# Patient Record
Sex: MALE | Race: WHITE | Employment: FULL TIME | URBAN - METROPOLITAN AREA
[De-identification: names, ages, dates, MRNs, and addresses within clinical notes are randomized per-mention and may not be internally consistent; named-entity substitution may affect disease eponyms.]

---

## 2018-01-23 ENCOUNTER — TELEPHONE (OUTPATIENT)
Dept: FAMILY MEDICINE CLINIC | Facility: CLINIC | Age: 58
End: 2018-01-23

## 2018-01-23 NOTE — TELEPHONE ENCOUNTER
This would be a new pt, wife is a pt of [address]  and is requesting we send a note back after she was informed your practice is close. Please call back and let pt know if [facility] can accept as new pt.

## 2018-01-24 NOTE — TELEPHONE ENCOUNTER
Called and informed pt on info. He would like to think about it before he picks a doctor. Informed him that there is a male doctor accepting in [place].

## 2018-01-29 ENCOUNTER — OFFICE VISIT (OUTPATIENT)
Dept: FAMILY MEDICINE CLINIC | Facility: CLINIC | Age: 58
End: 2018-01-29

## 2018-01-29 VITALS
HEART RATE: 70 BPM | BODY MASS INDEX: 27.6 KG/M2 | OXYGEN SATURATION: 95 % | SYSTOLIC BLOOD PRESSURE: 130 MMHG | DIASTOLIC BLOOD PRESSURE: 88 MMHG | HEIGHT: 68 IN | WEIGHT: 182.13 LBS | TEMPERATURE: 100 F

## 2018-01-29 DIAGNOSIS — M67.90 TENDINOPATHY: ICD-10-CM

## 2018-01-29 DIAGNOSIS — Z00.00 LABORATORY EXAM ORDERED AS PART OF ROUTINE GENERAL MEDICAL EXAMINATION: ICD-10-CM

## 2018-01-29 DIAGNOSIS — K40.90 LEFT INGUINAL HERNIA: ICD-10-CM

## 2018-01-29 DIAGNOSIS — Z12.5 SCREENING FOR PROSTATE CANCER: ICD-10-CM

## 2018-01-29 DIAGNOSIS — Z13.220 SCREENING, LIPID: ICD-10-CM

## 2018-01-29 DIAGNOSIS — L57.0 ACTINIC KERATOSES: ICD-10-CM

## 2018-01-29 DIAGNOSIS — Z12.11 COLON CANCER SCREENING: ICD-10-CM

## 2018-01-29 DIAGNOSIS — Z00.00 ROUTINE GENERAL MEDICAL EXAMINATION AT A HEALTH CARE FACILITY: Primary | ICD-10-CM

## 2018-01-29 DIAGNOSIS — Z13.1 SCREENING FOR DIABETES MELLITUS: ICD-10-CM

## 2018-01-29 DIAGNOSIS — Z13.0 SCREENING, ANEMIA, DEFICIENCY, IRON: ICD-10-CM

## 2018-01-29 DIAGNOSIS — K64.4 HEMORRHOIDAL SKIN TAG: ICD-10-CM

## 2018-01-29 DIAGNOSIS — Z23 NEED FOR DIPHTHERIA-TETANUS-PERTUSSIS (TDAP) VACCINE: ICD-10-CM

## 2018-01-29 PROCEDURE — 90471 IMMUNIZATION ADMIN: CPT | Performed by: FAMILY MEDICINE

## 2018-01-29 PROCEDURE — 90715 TDAP VACCINE 7 YRS/> IM: CPT | Performed by: FAMILY MEDICINE

## 2018-01-29 PROCEDURE — 99386 PREV VISIT NEW AGE 40-64: CPT | Performed by: FAMILY MEDICINE

## 2018-01-31 NOTE — PROGRESS NOTES
Jannette Bonilla is a 66year old male.     CC:  Patient presents with:  Physical: pt has knee and foot pain (small amout of pain at this time) room 5      HPI:  Here to estab and cpx    Feels generally well  Few skin lesions  Also  He has some chronic pain exertion  CARDIOVASCULAR: denies chest pain on exertion  GI: denies abdominal pain,denies heartburn   : denies nocturia or changes in stream  Hernia  See above  MUSCULOSKELETAL: denies back pain  Hand pain  Sl tender  NEURO: denies headaches  PSYCHE: den Orders    TETANUS, DIPHTHERIA TOXOIDS AND ACELLULAR PERTUSIS VACCINE (TDAP), >7 YEARS, IM USE (Completed)    Hemorrhoidal skin tag        observe  may discuss with dr Jerman Adame    Laboratory exam ordered as part of routine general medical examination        Re right dorsum #3 [facility]  Comments:  observe only          Meds & Refills for this Visit:  No prescriptions requested or ordered in this encounter    Imaging & Consults:  SURGERY - INTERNAL  TETANUS, DIPHTHERIA TOXOIDS AND ACELLULAR PERTUSIS VACCINE (TDAP), >7

## 2018-02-01 ENCOUNTER — LABORATORY ENCOUNTER (OUTPATIENT)
Dept: LAB | Age: 58
End: 2018-02-01
Attending: FAMILY MEDICINE
Payer: COMMERCIAL

## 2018-02-01 DIAGNOSIS — Z13.0 SCREENING, ANEMIA, DEFICIENCY, IRON: ICD-10-CM

## 2018-02-01 DIAGNOSIS — Z12.5 SCREENING FOR PROSTATE CANCER: ICD-10-CM

## 2018-02-01 DIAGNOSIS — Z13.1 SCREENING FOR DIABETES MELLITUS: ICD-10-CM

## 2018-02-01 DIAGNOSIS — Z13.220 SCREENING, LIPID: ICD-10-CM

## 2018-02-01 DIAGNOSIS — Z00.00 LABORATORY EXAM ORDERED AS PART OF ROUTINE GENERAL MEDICAL EXAMINATION: ICD-10-CM

## 2018-02-01 LAB
25-HYDROXYVITAMIN D (TOTAL): 13.9 NG/ML (ref 30–100)
ALBUMIN SERPL-MCNC: 3.8 G/DL (ref 3.5–4.8)
ALP LIVER SERPL-CCNC: 75 U/L (ref 45–117)
ALT SERPL-CCNC: 66 U/L (ref 17–63)
AST SERPL-CCNC: 42 U/L (ref 15–41)
BASOPHILS # BLD AUTO: 0.05 X10(3) UL (ref 0–0.1)
BASOPHILS NFR BLD AUTO: 0.9 %
BILIRUB SERPL-MCNC: 0.6 MG/DL (ref 0.1–2)
BUN BLD-MCNC: 10 MG/DL (ref 8–20)
CALCIUM BLD-MCNC: 8.7 MG/DL (ref 8.3–10.3)
CHLORIDE: 107 MMOL/L (ref 101–111)
CHOLEST SMN-MCNC: 189 MG/DL (ref ?–200)
CO2: 25 MMOL/L (ref 22–32)
COMPLEXED PSA SERPL-MCNC: 0.75 NG/ML (ref 0.01–4)
CREAT BLD-MCNC: 0.81 MG/DL (ref 0.7–1.3)
EOSINOPHIL # BLD AUTO: 0.42 X10(3) UL (ref 0–0.3)
EOSINOPHIL NFR BLD AUTO: 7.2 %
ERYTHROCYTE [DISTWIDTH] IN BLOOD BY AUTOMATED COUNT: 13.2 % (ref 11.5–16)
GLUCOSE BLD-MCNC: 95 MG/DL (ref 70–99)
HCT VFR BLD AUTO: 43.7 % (ref 37–53)
HDLC SERPL-MCNC: 58 MG/DL (ref 45–?)
HDLC SERPL: 3.26 {RATIO} (ref ?–4.97)
HGB BLD-MCNC: 14.4 G/DL (ref 13–17)
IMMATURE GRANULOCYTE COUNT: 0.03 X10(3) UL (ref 0–1)
IMMATURE GRANULOCYTE RATIO %: 0.5 %
LDLC SERPL CALC-MCNC: 91 MG/DL (ref ?–130)
LYMPHOCYTES # BLD AUTO: 1.59 X10(3) UL (ref 0.9–4)
LYMPHOCYTES NFR BLD AUTO: 27.1 %
M PROTEIN MFR SERPL ELPH: 7.1 G/DL (ref 6.1–8.3)
MCH RBC QN AUTO: 30.3 PG (ref 27–33.2)
MCHC RBC AUTO-ENTMCNC: 33 G/DL (ref 31–37)
MCV RBC AUTO: 91.8 FL (ref 80–99)
MONOCYTES # BLD AUTO: 0.58 X10(3) UL (ref 0.1–0.6)
MONOCYTES NFR BLD AUTO: 9.9 %
NEUTROPHIL ABS PRELIM: 3.2 X10 (3) UL (ref 1.3–6.7)
NEUTROPHILS # BLD AUTO: 3.2 X10(3) UL (ref 1.3–6.7)
NEUTROPHILS NFR BLD AUTO: 54.4 %
NONHDLC SERPL-MCNC: 131 MG/DL (ref ?–130)
PLATELET # BLD AUTO: 297 10(3)UL (ref 150–450)
POTASSIUM SERPL-SCNC: 4 MMOL/L (ref 3.6–5.1)
RBC # BLD AUTO: 4.76 X10(6)UL (ref 4.3–5.7)
RED CELL DISTRIBUTION WIDTH-SD: 44.7 FL (ref 35.1–46.3)
SODIUM SERPL-SCNC: 141 MMOL/L (ref 136–144)
TRIGL SERPL-MCNC: 200 MG/DL (ref ?–150)
VLDLC SERPL CALC-MCNC: 40 MG/DL (ref 5–40)
WBC # BLD AUTO: 5.9 X10(3) UL (ref 4–13)

## 2018-02-01 PROCEDURE — 36415 COLL VENOUS BLD VENIPUNCTURE: CPT | Performed by: FAMILY MEDICINE

## 2018-02-01 PROCEDURE — 84153 ASSAY OF PSA TOTAL: CPT | Performed by: FAMILY MEDICINE

## 2018-02-01 PROCEDURE — 85025 COMPLETE CBC W/AUTO DIFF WBC: CPT | Performed by: FAMILY MEDICINE

## 2018-02-01 PROCEDURE — 82306 VITAMIN D 25 HYDROXY: CPT | Performed by: FAMILY MEDICINE

## 2018-02-01 PROCEDURE — 80053 COMPREHEN METABOLIC PANEL: CPT | Performed by: FAMILY MEDICINE

## 2018-02-01 PROCEDURE — 80061 LIPID PANEL: CPT | Performed by: FAMILY MEDICINE

## 2018-02-20 ENCOUNTER — TELEPHONE (OUTPATIENT)
Dept: SURGERY | Facility: CLINIC | Age: 58
End: 2018-02-20

## 2018-02-20 ENCOUNTER — OFFICE VISIT (OUTPATIENT)
Dept: SURGERY | Facility: CLINIC | Age: 58
End: 2018-02-20

## 2018-02-20 VITALS
WEIGHT: 186 LBS | DIASTOLIC BLOOD PRESSURE: 90 MMHG | SYSTOLIC BLOOD PRESSURE: 128 MMHG | BODY MASS INDEX: 28 KG/M2 | TEMPERATURE: 97 F

## 2018-02-20 DIAGNOSIS — R19.4 CHANGE IN BOWEL HABITS: Primary | ICD-10-CM

## 2018-02-20 DIAGNOSIS — Z12.11 SPECIAL SCREENING FOR MALIGNANT NEOPLASMS, COLON: Primary | ICD-10-CM

## 2018-02-20 PROCEDURE — 99243 OFF/OP CNSLTJ NEW/EST LOW 30: CPT | Performed by: SURGERY

## 2018-02-20 NOTE — H&P
Ramesh Matthews is a 66year old male  Patient presents with:  Consult: Dr Sarina Du. Consult for colonoscopy. No change in BM's or blood in stool. Maternal grandma passed from colon ca. She was dx in her 66's.        REFERRED BY    Patient presents  For colon source Temporal, weight 186 lb. GENERAL: well developed, well nourished male, in no apparent distress.     MENTAL STATUS :Alert, oriented x 3  PSYCH: normal mood and affect  SKIN: anicteric, no rashes, no bruising  EYES: PERRLA, EOMI, sclera anicteric,  co 25-Hydroxyvitamin D (Total) 02/01/2018 13.9* 30.0 - 100.0 ng/mL Final   • Prostate Specific Antigen Screen 02/01/2018 0.75  0.01 - 4.00 ng/mL Final   • WBC 02/01/2018 5.9  4.0 - 13.0 x10(3) uL Final   • RBC 02/01/2018 4.76  4.30 - 5.70 x10(6)uL Final   • H

## 2018-02-21 RX ORDER — MULTIVIT-MIN/IRON/FOLIC ACID/K 18-600-40
1 CAPSULE ORAL 2 TIMES DAILY
COMMUNITY
End: 2021-04-23

## 2018-02-21 RX ORDER — MAGNESIUM CARB/ALUMINUM HYDROX 105-160MG
1 TABLET,CHEWABLE ORAL DAILY
COMMUNITY
End: 2021-02-15

## 2018-02-28 ENCOUNTER — TELEPHONE (OUTPATIENT)
Dept: FAMILY MEDICINE CLINIC | Facility: CLINIC | Age: 58
End: 2018-02-28

## 2018-02-28 NOTE — TELEPHONE ENCOUNTER
CPT: 78593  DX: Z12.11    I called [employer] and used the automated system. No auth required for out pt surgery.  Conf # is [id number]. sapna

## 2018-03-04 ENCOUNTER — ANESTHESIA EVENT (OUTPATIENT)
Dept: ENDOSCOPY | Facility: HOSPITAL | Age: 58
End: 2018-03-04

## 2018-03-05 ENCOUNTER — HOSPITAL ENCOUNTER (OUTPATIENT)
Facility: HOSPITAL | Age: 58
Setting detail: HOSPITAL OUTPATIENT SURGERY
Discharge: HOME | End: 2018-03-05
Attending: SURGERY | Admitting: SURGERY
Payer: COMMERCIAL

## 2018-03-05 ENCOUNTER — ANESTHESIA (OUTPATIENT)
Dept: ENDOSCOPY | Facility: HOSPITAL | Age: 58
End: 2018-03-05

## 2018-03-05 ENCOUNTER — SURGERY (OUTPATIENT)
Age: 58
End: 2018-03-05

## 2018-03-05 VITALS
WEIGHT: 180 LBS | HEART RATE: 65 BPM | TEMPERATURE: 99 F | SYSTOLIC BLOOD PRESSURE: 111 MMHG | BODY MASS INDEX: 27.28 KG/M2 | HEIGHT: 68 IN | RESPIRATION RATE: 18 BRPM | OXYGEN SATURATION: 94 % | DIASTOLIC BLOOD PRESSURE: 79 MMHG

## 2018-03-05 DIAGNOSIS — Z12.11 SPECIAL SCREENING FOR MALIGNANT NEOPLASMS, COLON: ICD-10-CM

## 2018-03-05 PROCEDURE — 0DBN8ZX EXCISION OF SIGMOID COLON, VIA NATURAL OR ARTIFICIAL OPENING ENDOSCOPIC, DIAGNOSTIC: ICD-10-PCS | Performed by: SURGERY

## 2018-03-05 PROCEDURE — 3E0H8GC INTRODUCTION OF OTHER THERAPEUTIC SUBSTANCE INTO LOWER GI, VIA NATURAL OR ARTIFICIAL OPENING ENDOSCOPIC: ICD-10-PCS | Performed by: SURGERY

## 2018-03-05 PROCEDURE — 0DBL8ZX EXCISION OF TRANSVERSE COLON, VIA NATURAL OR ARTIFICIAL OPENING ENDOSCOPIC, DIAGNOSTIC: ICD-10-PCS | Performed by: SURGERY

## 2018-03-05 PROCEDURE — 88305 TISSUE EXAM BY PATHOLOGIST: CPT | Performed by: SURGERY

## 2018-03-05 RX ORDER — MORPHINE SULFATE 4 MG/ML
2 INJECTION, SOLUTION INTRAMUSCULAR; INTRAVENOUS EVERY 5 MIN PRN
Status: DISCONTINUED | OUTPATIENT
Start: 2018-03-05 | End: 2018-03-05

## 2018-03-05 RX ORDER — SODIUM CHLORIDE, SODIUM LACTATE, POTASSIUM CHLORIDE, CALCIUM CHLORIDE 600; 310; 30; 20 MG/100ML; MG/100ML; MG/100ML; MG/100ML
INJECTION, SOLUTION INTRAVENOUS CONTINUOUS
Status: DISCONTINUED | OUTPATIENT
Start: 2018-03-05 | End: 2018-03-05

## 2018-03-05 RX ORDER — NALOXONE HYDROCHLORIDE 0.4 MG/ML
80 INJECTION, SOLUTION INTRAMUSCULAR; INTRAVENOUS; SUBCUTANEOUS AS NEEDED
Status: DISCONTINUED | OUTPATIENT
Start: 2018-03-05 | End: 2018-03-05

## 2018-03-05 NOTE — H&P
Patient presents  For colonoscopy screening   Pt has never had prior colonoscopy   No constipation or diahrrea  No family ho of colon cancer or polyps   Grandmother with colon cancer   No blood no change in BMs  Pos change in bowel habits            .    bruising  EYES: PERRLA, EOMI, sclera anicteric,  conjunctiva without pallor  HEENT: normocephalic, atraumatic, TMs clear, nares patent, mouth moist, pharynx w/o erythema  NECK: supple, no JVD, no adenopathy, no thyromegaly  RESPIRATORY: clear to auscultati RBC 02/01/2018 4.76  4.30 - 5.70 x10(6)uL Final   • HGB 02/01/2018 14.4  13.0 - 17.0 g/dL Final   • HCT 02/01/2018 43.7  37.0 - 53.0 % Final   • PLT 02/01/2018 297.0  150.0 - 450.0 10(3)uL Final   • MCV 02/01/2018 91.8  80.0 - 99.0 fL Final   • [facility] 02/01/2

## 2018-03-05 NOTE — ANESTHESIA PREPROCEDURE EVALUATION
PRE-OP EVALUATION    Patient Name: Matthew Plata    Pre-op Diagnosis: Special screening for malignant neoplasms, colon [Z12.11]    Procedure(s):  COLONOSCOPY    Surgeon(s) and Role:     Bertrand Olvera DO - [place]    Pre-op vitals reviewed.         Body  02/01/2018   K 4.0 02/01/2018    02/01/2018   CO2 25.0 02/01/2018   BUN 10 02/01/2018   CREATSERUM 0.81 02/01/2018   GLU 95 02/01/2018   CA 8.7 02/01/2018            Airway      Mallampati: II  Mouth opening: >3 FB  TM distance: > 6 cm  Neck

## 2018-03-05 NOTE — ANESTHESIA POSTPROCEDURE EVALUATION
[address] [place] Patient Status:  Hospital Outpatient Surgery   Age/Gender 66year old male MRN [id number]   Location [address] [facility]. Attending Pita Lerma, [facility] Day # 0 PCP Dejah Salas MD       Anesthesia Post-op Note

## 2018-03-06 ENCOUNTER — MED REC SCAN ONLY (OUTPATIENT)
Dept: FAMILY MEDICINE CLINIC | Facility: CLINIC | Age: 58
End: 2018-03-06

## 2018-03-06 NOTE — OPERATIVE REPORT
[facility]    PATIENT'S NAME: Bessy Sandy DOT   ATTENDING PHYSICIAN: Margaretmary Goldberg, D.O.   OPERATING PHYSICIAN: Margaretmary Goldberg, D.O.   PATIENT ACCOUNT#:   [id number]    LOCATION:  [address] [address] #:   [id number] out at the base adjacent to the polypectomy site. Patient also had scattered rare diverticulosis. The scope was then slowly withdrawn through the remainder of the rectum and the scope was retrieved. Patient tolerated the procedure well.     Dictated By Greg Hastings

## 2018-03-09 ENCOUNTER — TELEPHONE (OUTPATIENT)
Dept: FAMILY MEDICINE CLINIC | Facility: CLINIC | Age: 58
End: 2018-03-09

## 2018-03-09 NOTE — TELEPHONE ENCOUNTER
Pt states after he received the TDAP at his physical on 1/29/18 he had a headache for 3 weeks. The headache is now gone but wants to know if the headache could of been from the TDAP? Pt aware I will send the note to Dr Ruth Ann Rose and I will call him back.  sapna

## 2018-03-09 NOTE — TELEPHONE ENCOUNTER
This is not a side effect that I am hearing about prior    I cannot say that it is not from that, but not common

## 2019-04-29 ENCOUNTER — TELEPHONE (OUTPATIENT)
Dept: FAMILY MEDICINE CLINIC | Facility: CLINIC | Age: 59
End: 2019-04-29

## 2019-04-29 NOTE — TELEPHONE ENCOUNTER
Spouse Suzanne called and wants to know if Dr Dai David will take her  Galilea Zhang as a new patient?

## 2019-08-02 ENCOUNTER — PATIENT OUTREACH (OUTPATIENT)
Dept: SURGERY | Facility: CLINIC | Age: 59
End: 2019-08-02

## 2021-01-21 ENCOUNTER — TELEPHONE (OUTPATIENT)
Dept: FAMILY MEDICINE CLINIC | Facility: CLINIC | Age: 61
End: 2021-01-21

## 2021-01-21 NOTE — TELEPHONE ENCOUNTER
Wife is asking if she can bring in a urine sample ,she thinks he has a UTI. he has a persistent cough. Wife had covid in Dec he had same symptoms in dec but has had cough since.   Please advise

## 2021-01-21 NOTE — TELEPHONE ENCOUNTER
Pt returned call. Please call back,thanks     Wife was advised - to seek care at [facility] or . She will contact insurance to see if one is less expensive.

## 2021-02-15 ENCOUNTER — OFFICE VISIT (OUTPATIENT)
Dept: FAMILY MEDICINE CLINIC | Facility: CLINIC | Age: 61
End: 2021-02-15

## 2021-02-15 VITALS
BODY MASS INDEX: 27 KG/M2 | DIASTOLIC BLOOD PRESSURE: 82 MMHG | RESPIRATION RATE: 16 BRPM | OXYGEN SATURATION: 99 % | WEIGHT: 175 LBS | HEART RATE: 72 BPM | SYSTOLIC BLOOD PRESSURE: 130 MMHG | TEMPERATURE: 97 F

## 2021-02-15 DIAGNOSIS — R30.0 DYSURIA: Primary | ICD-10-CM

## 2021-02-15 LAB
MULTISTIX LOT#: ABNORMAL NUMERIC
PH, URINE: 7 (ref 4.5–8)
SPECIFIC GRAVITY: 1.01 (ref 1–1.03)
URINE-COLOR: YELLOW

## 2021-02-15 PROCEDURE — 3079F DIAST BP 80-89 MM HG: CPT | Performed by: PHYSICIAN ASSISTANT

## 2021-02-15 PROCEDURE — 87086 URINE CULTURE/COLONY COUNT: CPT | Performed by: PHYSICIAN ASSISTANT

## 2021-02-15 PROCEDURE — 3075F SYST BP GE 130 - 139MM HG: CPT | Performed by: PHYSICIAN ASSISTANT

## 2021-02-15 PROCEDURE — 87186 SC STD MICRODIL/AGAR DIL: CPT | Performed by: PHYSICIAN ASSISTANT

## 2021-02-15 PROCEDURE — 87077 CULTURE AEROBIC IDENTIFY: CPT | Performed by: PHYSICIAN ASSISTANT

## 2021-02-15 PROCEDURE — 99213 OFFICE O/P EST LOW 20 MIN: CPT | Performed by: PHYSICIAN ASSISTANT

## 2021-02-15 PROCEDURE — 81003 URINALYSIS AUTO W/O SCOPE: CPT | Performed by: PHYSICIAN ASSISTANT

## 2021-02-15 RX ORDER — CIPROFLOXACIN 500 MG/1
500 TABLET, FILM COATED ORAL 2 TIMES DAILY
Qty: 28 TABLET | Refills: 0 | Status: SHIPPED | OUTPATIENT
Start: 2021-02-15 | End: 2021-03-23

## 2021-02-15 NOTE — PATIENT INSTRUCTIONS
Bladder Infection, Male (Adult)     You have a bladder infection. Urine is normally free of bacteria. But bacteria can get into the urinary tract from the skin around the rectum. Or it may travel in the blood from other parts of the body.   This is sandra The most common cause of bladder infections is bacteria from the bowels. The bacteria get onto the skin around the opening of the urethra. From there they can get into the urine and travel up to the bladder. This causes inflammation and an infection.  This · Drink plenty of fluids, unless your healthcare provider told you not to. Fluids will prevent dehydration and flush out your bladder. · Use good personal hygiene. Wipe from front to back after using the toilet, and clean your penis regularly.  If you aren © 8506-8032 [employer] [address]. All rights reserved. This information is not intended as a substitute for professional medical care. Always follow your healthcare professional's instructions.

## 2021-02-15 NOTE — PROGRESS NOTES
CHIEF COMPLAINT:   Patient presents with:  UTI      HPI:   Rima Valenzuela is a 63year old male who presents with symptoms of UTI. Complaining of dysuria and frequency for the last 3 weeks. His Symptoms have been somewhat waxing and waning.   Treatment /82   Pulse 72   Temp 97 °F (36.1 °C)   Resp 16   Wt 175 lb (79.4 kg)   SpO2 99%   BMI 26.61 kg/m²   GENERAL: well developed, well nourished, and in no apparent distress  CARDIO: RRR, no murmurs  LUNGS: clear to ausculation bilaterally, no wheezing o This is called a urinary tract infection (UTI). An infection can occur anywhere in the urinary tract. It could be in a kidney (pyelonephritis) or in the bladder (cystitis) and urethra (urethritis).  The urethra is the tube that drains urine from the bladder The most common cause of bladder infections is bacteria from the bowels. The bacteria get onto the skin around the opening of the urethra. From there they can get into the urine and travel up to the bladder. This causes inflammation and an infection.  This · Drink plenty of fluids, unless your healthcare provider told you not to. Fluids will prevent dehydration and flush out your bladder. · Use good personal hygiene. Wipe from front to back after using the toilet, and clean your penis regularly.  If you aren © 6059-7917 [employer] [address]. All rights reserved. This information is not intended as a substitute for professional medical care. Always follow your healthcare professional's instructions.           The patient indicates understanding of these iss

## 2021-03-23 ENCOUNTER — OFFICE VISIT (OUTPATIENT)
Dept: FAMILY MEDICINE CLINIC | Facility: CLINIC | Age: 61
End: 2021-03-23

## 2021-03-23 VITALS
WEIGHT: 187.81 LBS | OXYGEN SATURATION: 98 % | TEMPERATURE: 98 F | DIASTOLIC BLOOD PRESSURE: 80 MMHG | BODY MASS INDEX: 28.46 KG/M2 | HEART RATE: 53 BPM | SYSTOLIC BLOOD PRESSURE: 120 MMHG | HEIGHT: 68 IN

## 2021-03-23 DIAGNOSIS — M79.672 CHRONIC PAIN OF BOTH FEET: ICD-10-CM

## 2021-03-23 DIAGNOSIS — K63.5 POLYP OF COLON, UNSPECIFIED PART OF COLON, UNSPECIFIED TYPE: ICD-10-CM

## 2021-03-23 DIAGNOSIS — K40.90 UNILATERAL INGUINAL HERNIA WITHOUT OBSTRUCTION OR GANGRENE, RECURRENCE NOT SPECIFIED: ICD-10-CM

## 2021-03-23 DIAGNOSIS — L57.0 ACTINIC KERATOSES: ICD-10-CM

## 2021-03-23 DIAGNOSIS — M79.671 CHRONIC PAIN OF BOTH FEET: ICD-10-CM

## 2021-03-23 DIAGNOSIS — K76.89 LIVER CYST: ICD-10-CM

## 2021-03-23 DIAGNOSIS — K42.9 UMBILICAL HERNIA WITHOUT OBSTRUCTION AND WITHOUT GANGRENE: ICD-10-CM

## 2021-03-23 DIAGNOSIS — G89.29 CHRONIC PAIN OF BOTH FEET: ICD-10-CM

## 2021-03-23 DIAGNOSIS — H91.93 BILATERAL HEARING LOSS, UNSPECIFIED HEARING LOSS TYPE: ICD-10-CM

## 2021-03-23 DIAGNOSIS — Z00.00 ROUTINE HISTORY AND PHYSICAL EXAMINATION OF ADULT: Primary | ICD-10-CM

## 2021-03-23 DIAGNOSIS — Z13.29 THYROID DISORDER SCREEN: ICD-10-CM

## 2021-03-23 DIAGNOSIS — Z13.0 SCREENING, ANEMIA, DEFICIENCY, IRON: ICD-10-CM

## 2021-03-23 DIAGNOSIS — Z13.220 LIPID SCREENING: ICD-10-CM

## 2021-03-23 DIAGNOSIS — R05.3 CHRONIC COUGH: ICD-10-CM

## 2021-03-23 DIAGNOSIS — Z12.11 COLON CANCER SCREENING: ICD-10-CM

## 2021-03-23 DIAGNOSIS — Z13.1 DIABETES MELLITUS SCREENING: ICD-10-CM

## 2021-03-23 DIAGNOSIS — Z77.011 LEAD EXPOSURE: ICD-10-CM

## 2021-03-23 DIAGNOSIS — N28.1 RENAL CYST: ICD-10-CM

## 2021-03-23 PROCEDURE — 80053 COMPREHEN METABOLIC PANEL: CPT | Performed by: FAMILY MEDICINE

## 2021-03-23 PROCEDURE — 82785 ASSAY OF IGE: CPT | Performed by: FAMILY MEDICINE

## 2021-03-23 PROCEDURE — 86003 ALLG SPEC IGE CRUDE XTRC EA: CPT | Performed by: FAMILY MEDICINE

## 2021-03-23 PROCEDURE — 84443 ASSAY THYROID STIM HORMONE: CPT | Performed by: FAMILY MEDICINE

## 2021-03-23 PROCEDURE — 3008F BODY MASS INDEX DOCD: CPT | Performed by: FAMILY MEDICINE

## 2021-03-23 PROCEDURE — 83036 HEMOGLOBIN GLYCOSYLATED A1C: CPT | Performed by: FAMILY MEDICINE

## 2021-03-23 PROCEDURE — 99386 PREV VISIT NEW AGE 40-64: CPT | Performed by: FAMILY MEDICINE

## 2021-03-23 PROCEDURE — 80061 LIPID PANEL: CPT | Performed by: FAMILY MEDICINE

## 2021-03-23 PROCEDURE — 3074F SYST BP LT 130 MM HG: CPT | Performed by: FAMILY MEDICINE

## 2021-03-23 PROCEDURE — 3079F DIAST BP 80-89 MM HG: CPT | Performed by: FAMILY MEDICINE

## 2021-03-23 PROCEDURE — 85025 COMPLETE CBC W/AUTO DIFF WBC: CPT | Performed by: FAMILY MEDICINE

## 2021-03-23 PROCEDURE — 83655 ASSAY OF LEAD: CPT | Performed by: FAMILY MEDICINE

## 2021-03-23 NOTE — PROGRESS NOTES
Amanda Garvey is a 63year old male who presents for a complete physical exam.   HPI:   Pt complains of a few things:    1) Would like referral for colonoscopy, had polyps 3 years ago, due for repeat.     2) would like a referral to podiatry for his feet Results   Component Value Date    HDL 57 03/23/2021    HDL 58 02/01/2018     Lab Results   Component Value Date     (H) 03/23/2021    LDL 91 02/01/2018     Lab Results   Component Value Date    AST 21 03/23/2021    AST 42 (H) 02/01/2018     Lab Resu supple,no adenopathy,no JVD  LUNGS: clear to auscultation  CARDIO: RRR without murmur  GI: good BS's,no masses, HSM or tenderness  : two descended testes,no masses,no hernia,no penile lesions  RECTAL: good rectal tone, prostate shows no masses, stool is gangrene  -     SURGERY - INTERNAL    Actinic keratoses  -     DERM - INTERNAL    Chronic pain of both feet  -     PODIATRY - INTERNAL    Liver cyst  -     US ABDOMEN COMPLETE (CPT=76700); Future    Renal cyst  -     US ABDOMEN COMPLETE (CPT=76700);  Future LIPID PANEL;  Future  -     ASSAY, THYROID STIM HORMONE; Future  -     PSA SCREEN; Future  -     HEMOGLOBIN A1C; Future  -     LEAD STANDARD PROFILE, BLOOD; Future  -     ALLERGY REGION 8; Future    Bilateral hearing loss, unspecified hearing loss type  -

## 2021-04-13 ENCOUNTER — OFFICE VISIT (OUTPATIENT)
Dept: SURGERY | Facility: CLINIC | Age: 61
End: 2021-04-13

## 2021-04-13 VITALS — HEART RATE: 70 BPM | BODY MASS INDEX: 28.34 KG/M2 | WEIGHT: 187 LBS | HEIGHT: 68 IN

## 2021-04-13 DIAGNOSIS — K63.5 POLYP OF COLON, UNSPECIFIED PART OF COLON, UNSPECIFIED TYPE: ICD-10-CM

## 2021-04-13 DIAGNOSIS — K40.90 INGUINAL HERNIA WITHOUT OBSTRUCTION OR GANGRENE, RECURRENCE NOT SPECIFIED, UNSPECIFIED LATERALITY: Primary | ICD-10-CM

## 2021-04-13 PROCEDURE — 3008F BODY MASS INDEX DOCD: CPT | Performed by: SURGERY

## 2021-04-13 PROCEDURE — 99244 OFF/OP CNSLTJ NEW/EST MOD 40: CPT | Performed by: SURGERY

## 2021-04-13 RX ORDER — SODIUM, POTASSIUM,MAG SULFATES 17.5-3.13G
SOLUTION, RECONSTITUTED, ORAL ORAL
Qty: 1 KIT | Refills: 0 | Status: SHIPPED | OUTPATIENT
Start: 2021-04-13 | End: 2021-07-12

## 2021-04-13 NOTE — H&P
Ciera Peterson is a 63year old male  Patient presents with:  Colonoscopy: est patient referred by Dr. Jose Ashley for colonoscopy consult. Last colonoscopy done 3 years-hx polyps  Hernia: est pt- referred by Dr. Jose Ashley for multiple hernias.   Denies any pain or d constipation, diarrhea; no rectal bleeding; no heartburn  GENITAL/: no dysuria, urgency or frequency, no tea colored urine  MUSCULOSKELETAL: no joint complaints upper or lower extremities  HEMATOLOGY: denies hx anemia; denies bruising or excessive bleedi Alkaline Phosphatase 03/23/2021 69  45 - 117 U/L Final   • Bilirubin, Total 03/23/2021 0.6  0.1 - 2.0 mg/dL Final   • Total Protein 03/23/2021 7.4  6.4 - 8.2 g/dL Final   • Albumin 03/23/2021 3.9  3.4 - 5.0 g/dL Final   • Globulin  03/23/2021 3.5  2.8 - 4. biotin consumption at least 72 hours prior to collection of a new sample.      • Prostate Specific Antigen Screen 03/23/2021 2.85  <=4.00 ng/mL Final    Comment: INTERPRETIVE INFORMATION: TOTAL PROSTATE SPECIFIC ANTIGEN:    The Siemens Total PSA(TPSA)chemil recommended by the American Diabetes Association. eAG levels reflect the long term average glucose and may not correlate with random or fasting glucose levels since these represent specific points in time.           • Lead, Blood (Venous) 03/23/2021 3.6  <= biological                                 monitoring are recommended. All ages        21-68.6 ug/dL  Removal from lead exposure                                 and prompt medical                                 evaluation are recommended. Common Pigweed 03/23/2021 <0.10  <0.10 kUA/L Final   • Allergen Common Ragweed 03/23/2021 0.71* <0.10 kUA/L Final   • Allergen Frio Tree 03/23/2021 <0.10  <0.10 kUA/L Final   • Allergen D. Farinae 03/23/2021 <0.10  <0.10 kUA/L Final   • Allergen D.  P Absolute 03/23/2021 1.44  1.00 - 4.00 x10(3) uL Final   • Monocyte Absolute 03/23/2021 0.43  0.10 - 1.00 x10(3) uL Final   • Eosinophil Absolute 03/23/2021 0.30  0.00 - 0.70 x10(3) uL Final   • Basophil Absolute 03/23/2021 0.06  0.00 - 0.20 x10(3) uL Final

## 2021-04-23 ENCOUNTER — OFFICE VISIT (OUTPATIENT)
Dept: ORTHOPEDICS CLINIC | Facility: CLINIC | Age: 61
End: 2021-04-23

## 2021-04-23 DIAGNOSIS — R26.9 GAIT ABNORMALITY: Primary | ICD-10-CM

## 2021-04-23 DIAGNOSIS — M19.079 ARTHRITIS OF MIDFOOT: ICD-10-CM

## 2021-04-23 PROCEDURE — 99202 OFFICE O/P NEW SF 15 MIN: CPT | Performed by: PODIATRIST

## 2021-04-23 NOTE — PROGRESS NOTES
[facility] New Patient Note    CC: Patient presents with:   Foot Pain: Patient is here today for bilateral foot pain, patient states that it hurts the most after being on his feet all day and taking a rest. He states that when he takes that firs type  Pain over dorsal lateral midfoot, vague, no swelling        Assessment/Diagnoses:  Midfoot arthritis david  Gait abnormality  High arches with arch pain david feet    Plan:  I reviewed exam findings with the patient.     checkk into custom orthotics

## 2021-05-05 ENCOUNTER — HOSPITAL ENCOUNTER (OUTPATIENT)
Dept: ULTRASOUND IMAGING | Facility: HOSPITAL | Age: 61
Discharge: HOME | End: 2021-05-05
Attending: FAMILY MEDICINE
Payer: COMMERCIAL

## 2021-05-05 ENCOUNTER — HOSPITAL ENCOUNTER (OUTPATIENT)
Dept: CT IMAGING | Facility: HOSPITAL | Age: 61
Discharge: HOME | End: 2021-05-05
Attending: FAMILY MEDICINE
Payer: COMMERCIAL

## 2021-05-05 DIAGNOSIS — Z77.011 LEAD EXPOSURE: ICD-10-CM

## 2021-05-05 DIAGNOSIS — R05.3 CHRONIC COUGH: ICD-10-CM

## 2021-05-05 DIAGNOSIS — K76.89 LIVER CYST: ICD-10-CM

## 2021-05-05 DIAGNOSIS — N28.1 RENAL CYST: ICD-10-CM

## 2021-05-05 PROCEDURE — 76700 US EXAM ABDOM COMPLETE: CPT | Performed by: FAMILY MEDICINE

## 2021-05-05 PROCEDURE — 71250 CT THORAX DX C-: CPT | Performed by: FAMILY MEDICINE

## 2021-05-10 DIAGNOSIS — K76.89 HEPATIC CYST: ICD-10-CM

## 2021-05-10 DIAGNOSIS — N28.1 RENAL CYST: Primary | ICD-10-CM

## 2021-05-19 ENCOUNTER — PATIENT MESSAGE (OUTPATIENT)
Dept: FAMILY MEDICINE CLINIC | Facility: CLINIC | Age: 61
End: 2021-05-19

## 2021-05-19 DIAGNOSIS — H90.3 SENSORY HEARING LOSS, BILATERAL: Primary | ICD-10-CM

## 2021-05-19 NOTE — TELEPHONE ENCOUNTER
From: Pauline Almanzar  To: Ananya Kan MD  Sent: 5/19/2021 10:49 AM CDT  Subject: Referral Request    Hi Dr. Cindy Núñez, I met with Fabian Ellison, an associate of Dr. Baron Erwin, about acquiring hearing aids.  I am asking you for a referral to proceed

## 2021-06-02 PROCEDURE — 88305 TISSUE EXAM BY PATHOLOGIST: CPT | Performed by: SURGERY

## 2021-06-11 ENCOUNTER — TELEPHONE (OUTPATIENT)
Dept: ORTHOPEDICS CLINIC | Facility: CLINIC | Age: 61
End: 2021-06-11

## 2021-06-11 NOTE — TELEPHONE ENCOUNTER
[facility] called to state that they need the codes changed for the DME referral. They have faxed over DME referral request , Fax printed and given to medical staff on 6/11/21 .

## 2021-06-23 ENCOUNTER — PATIENT OUTREACH (OUTPATIENT)
Dept: SURGERY | Facility: CLINIC | Age: 61
End: 2021-06-23

## 2021-06-24 ENCOUNTER — TELEPHONE (OUTPATIENT)
Dept: SURGERY | Facility: CLINIC | Age: 61
End: 2021-06-24

## 2021-06-24 NOTE — TELEPHONE ENCOUNTER
----- Message from Jazzmine Pederson DO sent at 6/4/2021  4:32 PM CDT -----  Call patient tell him pathology is benign repeat colonoscopy in 5 years and put in recall  Spoke with pt all questions answered and 5 yr recall entered.

## 2021-08-18 PROBLEM — N28.1 COMPLEX RENAL CYST: Status: ACTIVE | Noted: 2021-08-18

## 2021-10-08 ENCOUNTER — TELEPHONE (OUTPATIENT)
Dept: FAMILY MEDICINE CLINIC | Facility: CLINIC | Age: 61
End: 2021-10-08

## 2021-10-08 DIAGNOSIS — R73.03 PREDIABETES: ICD-10-CM

## 2021-10-08 DIAGNOSIS — E78.5 HYPERLIPIDEMIA, UNSPECIFIED HYPERLIPIDEMIA TYPE: Primary | ICD-10-CM

## 2021-10-08 NOTE — TELEPHONE ENCOUNTER
Letter mailed to patient reminding him he is due for labs.     Lab Frequency Next Occurrence   HEMOGLOBIN A1C Once 10/08/2021   LIPID PANEL Once 10/08/2021

## 2021-11-09 ENCOUNTER — TELEPHONE (OUTPATIENT)
Dept: FAMILY MEDICINE CLINIC | Facility: CLINIC | Age: 61
End: 2021-11-09

## 2022-01-28 ENCOUNTER — OFFICE VISIT (OUTPATIENT)
Dept: SURGERY | Facility: CLINIC | Age: 62
End: 2022-01-28
Payer: COMMERCIAL

## 2022-01-28 VITALS — BODY MASS INDEX: 28.04 KG/M2 | HEIGHT: 68 IN | WEIGHT: 185 LBS | TEMPERATURE: 97 F

## 2022-01-28 DIAGNOSIS — K42.9 UMBILICAL HERNIA WITHOUT OBSTRUCTION AND WITHOUT GANGRENE: Primary | ICD-10-CM

## 2022-01-28 DIAGNOSIS — K40.90 INGUINAL HERNIA, LEFT: ICD-10-CM

## 2022-01-28 PROCEDURE — 3008F BODY MASS INDEX DOCD: CPT | Performed by: SURGERY

## 2022-01-28 PROCEDURE — 99244 OFF/OP CNSLTJ NEW/EST MOD 40: CPT | Performed by: SURGERY

## 2022-01-28 NOTE — H&P
Luis F Camacho is a 63year old male  Patient presents with:  Hernia: est pt--left inguinal and umbilical hernia. pt denies pain but does notice a lump.        REFERRED BY    Patient presents with  Left inguinal hernia three years ago and umbilical hernia chest pain or YIP; no palpitations   GI: denies nausea, vomiting, constipation, diarrhea; no rectal bleeding; no heartburn  GENITAL/: no dysuria, urgency or frequency, no tea colored urine  MUSCULOSKELETAL: no joint complaints upper or lower extremities 08/18/2021 7/2,022  Date Final       ASSESSMENT   Imp: Umbilical and left inguinal hernia  Plan: Laparoscopic left inguinal herniorrhaphy with mesh and umbilical herniorrhaphy possible mesh discussed with patient risks of surgery to include bleeding infect

## 2022-02-19 ENCOUNTER — LAB ENCOUNTER (OUTPATIENT)
Dept: LAB | Age: 62
End: 2022-02-19
Attending: SURGERY
Payer: COMMERCIAL

## 2022-02-19 DIAGNOSIS — K40.90 INGUINAL HERNIA: ICD-10-CM

## 2022-02-20 LAB — SARS-COV-2 RNA RESP QL NAA+PROBE: NOT DETECTED

## 2022-02-21 ENCOUNTER — ANESTHESIA (OUTPATIENT)
Dept: SURGERY | Facility: HOSPITAL | Age: 62
End: 2022-02-21
Payer: COMMERCIAL

## 2022-02-21 ENCOUNTER — HOSPITAL ENCOUNTER (OUTPATIENT)
Facility: HOSPITAL | Age: 62
Setting detail: HOSPITAL OUTPATIENT SURGERY
Discharge: HOME | End: 2022-02-21
Attending: SURGERY | Admitting: SURGERY
Payer: COMMERCIAL

## 2022-02-21 ENCOUNTER — ANESTHESIA EVENT (OUTPATIENT)
Dept: SURGERY | Facility: HOSPITAL | Age: 62
End: 2022-02-21
Payer: COMMERCIAL

## 2022-02-21 VITALS
WEIGHT: 185 LBS | OXYGEN SATURATION: 98 % | SYSTOLIC BLOOD PRESSURE: 123 MMHG | DIASTOLIC BLOOD PRESSURE: 75 MMHG | HEART RATE: 67 BPM | RESPIRATION RATE: 16 BRPM | HEIGHT: 68 IN | BODY MASS INDEX: 28.04 KG/M2 | TEMPERATURE: 98 F

## 2022-02-21 DIAGNOSIS — K42.9 UMBILICAL HERNIA WITHOUT OBSTRUCTION AND WITHOUT GANGRENE: ICD-10-CM

## 2022-02-21 DIAGNOSIS — K40.90 INGUINAL HERNIA: Primary | ICD-10-CM

## 2022-02-21 DIAGNOSIS — K40.90 INGUINAL HERNIA, LEFT: ICD-10-CM

## 2022-02-21 PROCEDURE — 0WQF0ZZ REPAIR ABDOMINAL WALL, OPEN APPROACH: ICD-10-PCS | Performed by: SURGERY

## 2022-02-21 PROCEDURE — 0YU64JZ SUPPLEMENT LEFT INGUINAL REGION WITH SYNTHETIC SUBSTITUTE, PERCUTANEOUS ENDOSCOPIC APPROACH: ICD-10-PCS | Performed by: SURGERY

## 2022-02-21 DEVICE — BARD MESH
Type: IMPLANTABLE DEVICE | Site: UMBILICAL | Status: FUNCTIONAL
Brand: BARD MESH

## 2022-02-21 RX ORDER — GLYCOPYRROLATE 0.2 MG/ML
INJECTION, SOLUTION INTRAMUSCULAR; INTRAVENOUS AS NEEDED
Status: DISCONTINUED | OUTPATIENT
Start: 2022-02-21 | End: 2022-02-21 | Stop reason: SURG

## 2022-02-21 RX ORDER — SODIUM CHLORIDE, SODIUM LACTATE, POTASSIUM CHLORIDE, CALCIUM CHLORIDE 600; 310; 30; 20 MG/100ML; MG/100ML; MG/100ML; MG/100ML
INJECTION, SOLUTION INTRAVENOUS CONTINUOUS
Status: DISCONTINUED | OUTPATIENT
Start: 2022-02-21 | End: 2022-02-21

## 2022-02-21 RX ORDER — LIDOCAINE HYDROCHLORIDE 10 MG/ML
INJECTION, SOLUTION EPIDURAL; INFILTRATION; INTRACAUDAL; PERINEURAL AS NEEDED
Status: DISCONTINUED | OUTPATIENT
Start: 2022-02-21 | End: 2022-02-21 | Stop reason: SURG

## 2022-02-21 RX ORDER — ROCURONIUM BROMIDE 10 MG/ML
INJECTION, SOLUTION INTRAVENOUS AS NEEDED
Status: DISCONTINUED | OUTPATIENT
Start: 2022-02-21 | End: 2022-02-21 | Stop reason: SURG

## 2022-02-21 RX ORDER — ONDANSETRON 2 MG/ML
INJECTION INTRAMUSCULAR; INTRAVENOUS AS NEEDED
Status: DISCONTINUED | OUTPATIENT
Start: 2022-02-21 | End: 2022-02-21 | Stop reason: SURG

## 2022-02-21 RX ORDER — HEPARIN SODIUM 5000 [USP'U]/ML
5000 INJECTION, SOLUTION INTRAVENOUS; SUBCUTANEOUS ONCE
Status: COMPLETED | OUTPATIENT
Start: 2022-02-21 | End: 2022-02-21

## 2022-02-21 RX ORDER — HYDROCODONE BITARTRATE AND ACETAMINOPHEN 5; 325 MG/1; MG/1
2 TABLET ORAL AS NEEDED
Status: DISCONTINUED | OUTPATIENT
Start: 2022-02-21 | End: 2022-02-21

## 2022-02-21 RX ORDER — NALOXONE HYDROCHLORIDE 0.4 MG/ML
80 INJECTION, SOLUTION INTRAMUSCULAR; INTRAVENOUS; SUBCUTANEOUS AS NEEDED
Status: DISCONTINUED | OUTPATIENT
Start: 2022-02-21 | End: 2022-02-21

## 2022-02-21 RX ORDER — HYDROMORPHONE HYDROCHLORIDE 1 MG/ML
INJECTION, SOLUTION INTRAMUSCULAR; INTRAVENOUS; SUBCUTANEOUS
Status: COMPLETED
Start: 2022-02-21 | End: 2022-02-21

## 2022-02-21 RX ORDER — KETOROLAC TROMETHAMINE 30 MG/ML
INJECTION, SOLUTION INTRAMUSCULAR; INTRAVENOUS AS NEEDED
Status: DISCONTINUED | OUTPATIENT
Start: 2022-02-21 | End: 2022-02-21 | Stop reason: SURG

## 2022-02-21 RX ORDER — DEXAMETHASONE SODIUM PHOSPHATE 4 MG/ML
VIAL (ML) INJECTION AS NEEDED
Status: DISCONTINUED | OUTPATIENT
Start: 2022-02-21 | End: 2022-02-21 | Stop reason: SURG

## 2022-02-21 RX ORDER — CEFAZOLIN SODIUM/WATER 2 G/20 ML
2 SYRINGE (ML) INTRAVENOUS ONCE
Status: COMPLETED | OUTPATIENT
Start: 2022-02-21 | End: 2022-02-21

## 2022-02-21 RX ORDER — ACETAMINOPHEN 500 MG
1000 TABLET ORAL ONCE
Status: DISCONTINUED | OUTPATIENT
Start: 2022-02-21 | End: 2022-02-21 | Stop reason: HOSPADM

## 2022-02-21 RX ORDER — HYDROMORPHONE HYDROCHLORIDE 1 MG/ML
0.4 INJECTION, SOLUTION INTRAMUSCULAR; INTRAVENOUS; SUBCUTANEOUS EVERY 5 MIN PRN
Status: DISCONTINUED | OUTPATIENT
Start: 2022-02-21 | End: 2022-02-21

## 2022-02-21 RX ORDER — ONDANSETRON 2 MG/ML
4 INJECTION INTRAMUSCULAR; INTRAVENOUS AS NEEDED
Status: DISCONTINUED | OUTPATIENT
Start: 2022-02-21 | End: 2022-02-21

## 2022-02-21 RX ORDER — HYDROCODONE BITARTRATE AND ACETAMINOPHEN 5; 325 MG/1; MG/1
1 TABLET ORAL AS NEEDED
Status: DISCONTINUED | OUTPATIENT
Start: 2022-02-21 | End: 2022-02-21

## 2022-02-21 RX ORDER — NEOSTIGMINE METHYLSULFATE 1 MG/ML
INJECTION INTRAVENOUS AS NEEDED
Status: DISCONTINUED | OUTPATIENT
Start: 2022-02-21 | End: 2022-02-21 | Stop reason: SURG

## 2022-02-21 RX ORDER — BUPIVACAINE HYDROCHLORIDE AND EPINEPHRINE 5; 5 MG/ML; UG/ML
INJECTION, SOLUTION EPIDURAL; INTRACAUDAL; PERINEURAL AS NEEDED
Status: DISCONTINUED | OUTPATIENT
Start: 2022-02-21 | End: 2022-02-21 | Stop reason: HOSPADM

## 2022-02-21 RX ORDER — HYDROCODONE BITARTRATE AND ACETAMINOPHEN 5; 325 MG/1; MG/1
1 TABLET ORAL EVERY 6 HOURS PRN
Qty: 15 TABLET | Refills: 0 | Status: SHIPPED | OUTPATIENT
Start: 2022-02-21 | End: 2022-03-25

## 2022-02-21 RX ADMIN — SODIUM CHLORIDE, SODIUM LACTATE, POTASSIUM CHLORIDE, CALCIUM CHLORIDE: 600; 310; 30; 20 INJECTION, SOLUTION INTRAVENOUS at 08:34:00

## 2022-02-21 RX ADMIN — ONDANSETRON 4 MG: 2 INJECTION INTRAMUSCULAR; INTRAVENOUS at 08:35:00

## 2022-02-21 RX ADMIN — NEOSTIGMINE METHYLSULFATE 4 MG: 1 INJECTION INTRAVENOUS at 09:05:00

## 2022-02-21 RX ADMIN — CEFAZOLIN SODIUM/WATER 2 G: 2 G/20 ML SYRINGE (ML) INTRAVENOUS at 07:39:00

## 2022-02-21 RX ADMIN — SODIUM CHLORIDE, SODIUM LACTATE, POTASSIUM CHLORIDE, CALCIUM CHLORIDE: 600; 310; 30; 20 INJECTION, SOLUTION INTRAVENOUS at 07:29:00

## 2022-02-21 RX ADMIN — GLYCOPYRROLATE 0.6 MG: 0.2 INJECTION, SOLUTION INTRAMUSCULAR; INTRAVENOUS at 09:05:00

## 2022-02-21 RX ADMIN — KETOROLAC TROMETHAMINE 30 MG: 30 INJECTION, SOLUTION INTRAMUSCULAR; INTRAVENOUS at 08:35:00

## 2022-02-21 RX ADMIN — SODIUM CHLORIDE, SODIUM LACTATE, POTASSIUM CHLORIDE, CALCIUM CHLORIDE: 600; 310; 30; 20 INJECTION, SOLUTION INTRAVENOUS at 08:02:00

## 2022-02-21 RX ADMIN — ROCURONIUM BROMIDE 40 MG: 10 INJECTION, SOLUTION INTRAVENOUS at 07:35:00

## 2022-02-21 RX ADMIN — GLYCOPYRROLATE 0.2 MG: 0.2 INJECTION, SOLUTION INTRAMUSCULAR; INTRAVENOUS at 08:16:00

## 2022-02-21 RX ADMIN — DEXAMETHASONE SODIUM PHOSPHATE 8 MG: 4 MG/ML VIAL (ML) INJECTION at 07:39:00

## 2022-02-21 RX ADMIN — LIDOCAINE HYDROCHLORIDE 50 MG: 10 INJECTION, SOLUTION EPIDURAL; INFILTRATION; INTRACAUDAL; PERINEURAL at 07:35:00

## 2022-02-21 RX ADMIN — ROCURONIUM BROMIDE 10 MG: 10 INJECTION, SOLUTION INTRAVENOUS at 08:00:00

## 2022-02-21 NOTE — ANESTHESIA POSTPROCEDURE EVALUATION
[address] Patient Status:  Hospital Outpatient Surgery   Age/Gender 63year old male MRN [id number]   Location [address] [facility] Attending Queen Nancy, [address] [facility] Day # 0 PCP Sasha Gorman MD       Anesthesia Post-op Note    LAPAROSCOPIC LEFT INGUINAL HERNIA REPAIR WITH MESH AND UMBILICAL HERNIORRHAPHY. Procedure Summary     Date: 02/21/22 Room / Location: [facility] MAIN OR 08 / [facility] MAIN OR    Anesthesia Start: 0531 Anesthesia Stop: 9188    Procedures:       LAPAROSCOPIC LEFT INGUINAL HERNIA REPAIR WITH MESH AND UMBILICAL HERNIORRHAPHY. (Left Abdomen)      HERNIA UMBILICAL REPAIR ADULT (N/A Abdomen) Diagnosis:       Umbilical hernia without obstruction and without gangrene      Inguinal hernia, left      (Umbilical hernia without obstruction and without gangrene [X35. 9]Inguinal hernia, left [K40.90])    Surgeons: Queen Nancy DO Anesthesiologist: April Louis MD    Anesthesia Type: general ASA Status: 2          Anesthesia Type: general    Vitals Value Taken Time   /71 02/21/22 0924   Temp 97.4 02/21/22 0924   Pulse 73 02/21/22 0924   Resp 12 02/21/22 0924   SpO2 93 02/21/22 0924       Patient Location: PACU    Anesthesia Type: general    Airway Patency: patent    Postop Pain Control: adequate    Mental Status: mildly sedated but able to meaningfully participate in the post-anesthesia evaluation    Nausea/Vomiting: none    Cardiopulmonary/Hydration status: stable euvolemic    Complications: no apparent anesthesia related complications    Postop vital signs: stable    Dental Exam: Unchanged from Preop    Patient to be discharged from PACU when criteria met.

## 2022-02-21 NOTE — ANESTHESIA PROCEDURE NOTES
Airway  Date/Time: 2/21/2022 7:37 AM  Urgency: elective      General Information and Staff    Patient location during procedure: OR  Anesthesiologist: Denisha Naik MD  Performed: anesthesiologist     Indications and Patient Condition  Indications for airway management: anesthesia  Spontaneous Ventilation: absent  Sedation level: deep  Preoxygenated: yes  Patient position: sniffing  Mask difficulty assessment: 1 - vent by mask    Final Airway Details  Final airway type: endotracheal airway      Successful airway: ETT  Cuffed: yes   Successful intubation technique: direct laryngoscopy  Endotracheal tube insertion site: oral  Blade: Von  Blade size: #3  ETT size (mm): 7.5    Cormack-Lehane Classification: grade I - full view of glottis  Placement verified by: chest auscultation and capnometry   Cuff volume (mL): 6  Measured from: lips  Number of attempts at approach: 1

## 2022-02-23 NOTE — OPERATIVE REPORT
[facility]    PATIENT'S NAME: Baron Delgadillo   ATTENDING PHYSICIAN: Sanjuanita Childers D.O.   OPERATING PHYSICIAN: Sanjuanita Childers D.O.   PATIENT ACCOUNT#:   [de-identified]    LOCATION:  [facility] PRE [address] [place] [address]  MEDICAL RECORD #:   [id number]       YOB: 1960  ADMISSION DATE:       02/21/2022      OPERATION DATE:  02/21/2022    OPERATIVE REPORT      PREOPERATIVE DIAGNOSIS:  Left inguinal hernia and umbilical hernia. POSTOPERATIVE DIAGNOSIS:  Direct and indirect left inguinal hernia with umbilical hernia. PROCEDURE:  Laparoscopic left inguinal herniorrhaphy with mesh and umbilical herniorrhaphy. ASSISTANT:  Safia Churchill PA-C. ANESTHESIA:  General.    COMPLICATIONS:  None. OPERATIVE TECHNIQUE:  An informed consent was previously obtained. The patient was taken to the operative suite and placed in the supine position. General inhalational anesthetic was administered by the anesthesia department, and the anterior abdomen was prepped and draped in usual sterile fashion. A skin incision was made 1 cm superior to the umbilicus and carried along the left-hand side of the umbilicus. Sharp dissection carried down through skin and subcutaneous tissue. Dissection was carried out down into the depths of the umbilical stalk which was amputated using handheld electrocautery. The peritoneum was grasped and brought up into the incision, incised, and the abdomen was entered. A Fanta trocar was placed at this location. Insufflation was carried out of the abdominal cavity. The #5 trocars were placed at the right and left mid quadrant under laparoscopic visualization. Attention was directed toward the left lower quadrant where the patient was found to have obvious evidence of a direct hernial defect. The peritoneum 4 cm cephalad to the internal ring was grasped, tented posteriorly and incised in a transverse fashion.   The preperitoneal space was developed and dissected down to the pubic tubercle. The direct hernial defect was reduced back into the preperitoneal space. Further dissection was carried out laterally at the internal ring demonstrating a large lipoma of the cord which was reduced also back into the preperitoneal space with blunt dissection. A 3 x 5 Prolene mesh was then placed within the abdominal cavity. It was tacked along the anterior pubic ramus, lateral to the internal ring and posterior to the rectus musculature. The posterior peritoneum was then brought in opposition to the anterior peritoneum and tacked at 1 cm intervals. Trocars were then removed, and attention was directed toward the umbilicus where the fascial layer was approximated using interrupted 2-0 Ethibond in a pants-over-vest fashion. The posterior umbilical skin was sutured to the fascia using interrupted 3-0 Vicryl suture. Skin edges approximated using a 4-0 Monocryl in a running subdermal fashion. Overlying Mastisol and Steri-Strips were applied. Patient was transported from the operative suite to Recovery in stable condition.     Dictated By Toma Long D.O.  d: 02/22/2022 17:49:57  t: 02/22/2022 21:41:39  Job [id number]  /    cc: Kaylah Hernandez D.O.

## 2022-03-02 ENCOUNTER — OFFICE VISIT (OUTPATIENT)
Dept: ORTHOPEDICS CLINIC | Facility: CLINIC | Age: 62
End: 2022-03-02
Payer: COMMERCIAL

## 2022-03-02 ENCOUNTER — HOSPITAL ENCOUNTER (OUTPATIENT)
Dept: GENERAL RADIOLOGY | Age: 62
Discharge: HOME | End: 2022-03-02
Attending: PODIATRIST
Payer: COMMERCIAL

## 2022-03-02 VITALS — HEIGHT: 68 IN | WEIGHT: 185 LBS | BODY MASS INDEX: 28.04 KG/M2

## 2022-03-02 DIAGNOSIS — M79.672 LEFT FOOT PAIN: ICD-10-CM

## 2022-03-02 DIAGNOSIS — M19.079 ARTHRITIS, MIDFOOT: ICD-10-CM

## 2022-03-02 DIAGNOSIS — M79.672 LEFT FOOT PAIN: Primary | ICD-10-CM

## 2022-03-02 PROCEDURE — 73630 X-RAY EXAM OF FOOT: CPT | Performed by: PODIATRIST

## 2022-03-02 PROCEDURE — 99213 OFFICE O/P EST LOW 20 MIN: CPT | Performed by: PODIATRIST

## 2022-03-02 PROCEDURE — 3008F BODY MASS INDEX DOCD: CPT | Performed by: PODIATRIST

## 2022-03-02 RX ORDER — MELOXICAM 7.5 MG/1
7.5 TABLET ORAL DAILY
Qty: 30 TABLET | Refills: 1 | Status: SHIPPED | OUTPATIENT
Start: 2022-03-02

## 2022-03-02 NOTE — PROGRESS NOTES
[facility] Progress Note    Subjective:   Here for follow-up of his left foot. He has put himself in his low-profile boot to help with some of the pain. About 1 month ago left foot started hurting  About 1 year ago, had vague pain over top of both feet  Has had orthotics from [facility] for months now and really had helped  No injury to left  Wearing same work boots and got a bigger size with the new orthotics several months ago      Objective:     Exam pain across the dorsal midfoot and dorsal lateral aspect of the left foot. No visible swelling today. xrays show mild midfoot OAD left      Assessment/Plan:     Diagnoses and all orders for this visit:    Left foot pain  -     XR FOOT WEIGHTBEARING (3 VIEWS), LEFT (CPT=73630); Future    Arthritis, midfoot        10 you in the Robo boot if that is helpful. We discussed the midfoot arthritis and what he can expect, times of exacerbation and then times where he has less discomfort. May need orthotics new in 1 year   meloxicam Rx  Follow up 6 weeks or so if not improving otherwise yearly with Dayana Richards DPM  [facility]    You.Do speech recognition software was used to prepare this note. If a word or phrase is confusing, it is likely do to a failure of recognition. Please contact me with any questions or clarifications.

## 2022-03-25 ENCOUNTER — OFFICE VISIT (OUTPATIENT)
Dept: SURGERY | Facility: CLINIC | Age: 62
End: 2022-03-25

## 2022-03-25 VITALS — TEMPERATURE: 98 F | WEIGHT: 195 LBS | HEIGHT: 68 IN | BODY MASS INDEX: 29.55 KG/M2

## 2022-03-25 DIAGNOSIS — K40.90 LEFT INGUINAL HERNIA: Primary | ICD-10-CM

## 2022-03-25 PROCEDURE — 3008F BODY MASS INDEX DOCD: CPT | Performed by: SURGERY

## 2022-03-25 PROCEDURE — 99024 POSTOP FOLLOW-UP VISIT: CPT | Performed by: SURGERY

## 2022-03-25 NOTE — PROGRESS NOTES
Patient status post laparoscopic left inguinal herniorrhaphy with mesh and umbilical herniorrhaphy he denies complaints tolerating his current activities. Physical examination demonstrates no evidence of residual hernia.   Left testicle is normal on examination plan no exertion for 1 week follow-up as needed

## 2022-04-01 ENCOUNTER — OFFICE VISIT (OUTPATIENT)
Dept: FAMILY MEDICINE CLINIC | Facility: CLINIC | Age: 62
End: 2022-04-01
Payer: COMMERCIAL

## 2022-04-01 VITALS
WEIGHT: 199 LBS | HEART RATE: 72 BPM | SYSTOLIC BLOOD PRESSURE: 120 MMHG | HEIGHT: 67.75 IN | OXYGEN SATURATION: 97 % | RESPIRATION RATE: 16 BRPM | DIASTOLIC BLOOD PRESSURE: 84 MMHG | TEMPERATURE: 98 F | BODY MASS INDEX: 30.51 KG/M2

## 2022-04-01 DIAGNOSIS — Z00.00 ANNUAL PHYSICAL EXAM: Primary | ICD-10-CM

## 2022-04-01 DIAGNOSIS — M62.08 DIASTASIS RECTI: ICD-10-CM

## 2022-04-01 DIAGNOSIS — Z13.0 SCREENING, ANEMIA, DEFICIENCY, IRON: ICD-10-CM

## 2022-04-01 DIAGNOSIS — R73.03 PREDIABETES: ICD-10-CM

## 2022-04-01 DIAGNOSIS — E78.5 HYPERLIPIDEMIA, UNSPECIFIED HYPERLIPIDEMIA TYPE: ICD-10-CM

## 2022-04-01 DIAGNOSIS — Z98.890 S/P LEFT INGUINAL HERNIA REPAIR: ICD-10-CM

## 2022-04-01 DIAGNOSIS — Z87.19 S/P LEFT INGUINAL HERNIA REPAIR: ICD-10-CM

## 2022-04-01 DIAGNOSIS — Z13.29 SCREENING FOR THYROID DISORDER: ICD-10-CM

## 2022-04-01 DIAGNOSIS — K76.89 LIVER CYST: ICD-10-CM

## 2022-04-01 DIAGNOSIS — K63.5 POLYP OF COLON, UNSPECIFIED PART OF COLON, UNSPECIFIED TYPE: ICD-10-CM

## 2022-04-01 DIAGNOSIS — N28.1 COMPLEX RENAL CYST: ICD-10-CM

## 2022-04-01 PROBLEM — K40.90 LEFT INGUINAL HERNIA: Status: RESOLVED | Noted: 2022-02-21 | Resolved: 2022-04-01

## 2022-04-01 PROBLEM — K42.9 UMBILICAL HERNIA WITHOUT OBSTRUCTION AND WITHOUT GANGRENE: Status: RESOLVED | Noted: 2022-02-21 | Resolved: 2022-04-01

## 2022-04-01 LAB
ALBUMIN SERPL-MCNC: 4 G/DL (ref 3.4–5)
ALBUMIN/GLOB SERPL: 1.2 {RATIO} (ref 1–2)
ALP LIVER SERPL-CCNC: 66 U/L
ALT SERPL-CCNC: 39 U/L
ANION GAP SERPL CALC-SCNC: 8 MMOL/L (ref 0–18)
AST SERPL-CCNC: 23 U/L (ref 15–37)
BASOPHILS # BLD AUTO: 0.06 X10(3) UL (ref 0–0.2)
BASOPHILS NFR BLD AUTO: 0.9 %
BILIRUB SERPL-MCNC: 0.5 MG/DL (ref 0.1–2)
BUN BLD-MCNC: 19 MG/DL (ref 7–18)
CHLORIDE SERPL-SCNC: 108 MMOL/L (ref 98–112)
CHOLEST SERPL-MCNC: 232 MG/DL (ref ?–200)
CO2 SERPL-SCNC: 24 MMOL/L (ref 21–32)
CREAT BLD-MCNC: 0.82 MG/DL
EOSINOPHIL # BLD AUTO: 0.26 X10(3) UL (ref 0–0.7)
EOSINOPHIL NFR BLD AUTO: 4 %
ERYTHROCYTE [DISTWIDTH] IN BLOOD BY AUTOMATED COUNT: 12.5 %
EST. AVERAGE GLUCOSE BLD GHB EST-MCNC: 134 MG/DL (ref 68–126)
FASTING PATIENT LIPID ANSWER: YES
FASTING STATUS PATIENT QL REPORTED: YES
GLOBULIN PLAS-MCNC: 3.3 G/DL (ref 2.8–4.4)
GLUCOSE BLD-MCNC: 120 MG/DL (ref 70–99)
HBA1C MFR BLD: 6.3 % (ref ?–5.7)
HCT VFR BLD AUTO: 49.9 %
HGB BLD-MCNC: 16.6 G/DL
IMM GRANULOCYTES # BLD AUTO: 0.02 X10(3) UL (ref 0–1)
IMM GRANULOCYTES NFR BLD: 0.3 %
LDLC SERPL CALC-MCNC: 148 MG/DL (ref ?–100)
LYMPHOCYTES # BLD AUTO: 1.73 X10(3) UL (ref 1–4)
LYMPHOCYTES NFR BLD AUTO: 26.7 %
MCH RBC QN AUTO: 30.1 PG (ref 26–34)
MCHC RBC AUTO-ENTMCNC: 33.3 G/DL (ref 31–37)
MCV RBC AUTO: 90.6 FL
MONOCYTES # BLD AUTO: 0.6 X10(3) UL (ref 0.1–1)
MONOCYTES NFR BLD AUTO: 9.3 %
NEUTROPHILS # BLD AUTO: 3.8 X10 (3) UL (ref 1.5–7.7)
NEUTROPHILS # BLD AUTO: 3.8 X10(3) UL (ref 1.5–7.7)
NEUTROPHILS NFR BLD AUTO: 58.8 %
NONHDLC SERPL-MCNC: 173 MG/DL (ref ?–130)
OSMOLALITY SERPL CALC.SUM OF ELEC: 293 MOSM/KG (ref 275–295)
PLATELET # BLD AUTO: 220 10(3)UL (ref 150–450)
POTASSIUM SERPL-SCNC: 4.2 MMOL/L (ref 3.5–5.1)
PROT SERPL-MCNC: 7.3 G/DL (ref 6.4–8.2)
RBC # BLD AUTO: 5.51 X10(6)UL
SODIUM SERPL-SCNC: 140 MMOL/L (ref 136–145)
T4 FREE SERPL-MCNC: 0.8 NG/DL (ref 0.8–1.7)
TRIGL SERPL-MCNC: 143 MG/DL (ref 30–149)
TSI SER-ACNC: 3.76 MIU/ML (ref 0.36–3.74)
VLDLC SERPL CALC-MCNC: 27 MG/DL (ref 0–30)
WBC # BLD AUTO: 6.5 X10(3) UL (ref 4–11)

## 2022-04-01 PROCEDURE — 84439 ASSAY OF FREE THYROXINE: CPT | Performed by: FAMILY MEDICINE

## 2022-04-01 PROCEDURE — 99396 PREV VISIT EST AGE 40-64: CPT | Performed by: FAMILY MEDICINE

## 2022-04-01 PROCEDURE — 3008F BODY MASS INDEX DOCD: CPT | Performed by: FAMILY MEDICINE

## 2022-04-01 PROCEDURE — 3074F SYST BP LT 130 MM HG: CPT | Performed by: FAMILY MEDICINE

## 2022-04-01 PROCEDURE — 90471 IMMUNIZATION ADMIN: CPT | Performed by: FAMILY MEDICINE

## 2022-04-01 PROCEDURE — 83036 HEMOGLOBIN GLYCOSYLATED A1C: CPT | Performed by: FAMILY MEDICINE

## 2022-04-01 PROCEDURE — 85025 COMPLETE CBC W/AUTO DIFF WBC: CPT | Performed by: FAMILY MEDICINE

## 2022-04-01 PROCEDURE — 80053 COMPREHEN METABOLIC PANEL: CPT | Performed by: FAMILY MEDICINE

## 2022-04-01 PROCEDURE — 80061 LIPID PANEL: CPT | Performed by: FAMILY MEDICINE

## 2022-04-01 PROCEDURE — 84443 ASSAY THYROID STIM HORMONE: CPT | Performed by: FAMILY MEDICINE

## 2022-04-01 PROCEDURE — 90750 HZV VACC RECOMBINANT IM: CPT | Performed by: FAMILY MEDICINE

## 2022-04-01 PROCEDURE — 3079F DIAST BP 80-89 MM HG: CPT | Performed by: FAMILY MEDICINE

## 2022-04-11 ENCOUNTER — TELEPHONE (OUTPATIENT)
Dept: FAMILY MEDICINE CLINIC | Facility: CLINIC | Age: 62
End: 2022-04-11

## 2022-04-11 NOTE — TELEPHONE ENCOUNTER
Patient advised of Doctor's note below. Patient verbalized understanding. No further questions at this time.     Pt to call back to schedule appt

## 2022-04-11 NOTE — TELEPHONE ENCOUNTER
----- Message from Mydhili Ulysses Loupe, MD sent at 4/10/2022  3:58 PM CDT -----  Sugars trending up, and A1C still 6.3 (0.1 away from being a diabetic). Cholesterol also definitely needs to be addressed, these numbers are concerning for increase risk of heart disease. We should discuss interventions / medications at this time. Please have him schedule a video visit with me.  TY ~ MM

## 2022-05-16 ENCOUNTER — PATIENT MESSAGE (OUTPATIENT)
Dept: ORTHOPEDICS CLINIC | Facility: CLINIC | Age: 62
End: 2022-05-16

## 2022-05-16 RX ORDER — MELOXICAM 7.5 MG/1
7.5 TABLET ORAL DAILY
Qty: 30 TABLET | Refills: 1 | Status: SHIPPED | OUTPATIENT
Start: 2022-05-16

## 2022-05-16 NOTE — TELEPHONE ENCOUNTER
From: Lucy Abreu  To: Adrien Alpers.  Shira Moreno DPM  Sent: 5/16/2022 9:40 AM CDT  Subject: Prescription Refill    Hi Dr. Shira Moreno  Please renew my anti-inflammatory prescription: Meloxicam 7.5 mg.   I use [facility] [place]   Thank you!!  Kennedy Amador 01/30/1960  [phone]

## 2022-06-02 ENCOUNTER — TELEPHONE (OUTPATIENT)
Dept: FAMILY MEDICINE CLINIC | Facility: CLINIC | Age: 62
End: 2022-06-02

## 2022-06-02 NOTE — TELEPHONE ENCOUNTER
PT is due for his 2nd Shingrix vaccine. Letter mailed to him letting him know to call and schedule.      MATT Rizzo Mydhili Nurse  Due for 2nd Shingrix

## 2022-06-11 ENCOUNTER — TELEPHONE (OUTPATIENT)
Dept: FAMILY MEDICINE CLINIC | Facility: CLINIC | Age: 62
End: 2022-06-11

## 2022-06-15 ENCOUNTER — NURSE ONLY (OUTPATIENT)
Dept: FAMILY MEDICINE CLINIC | Facility: CLINIC | Age: 62
End: 2022-06-15
Payer: COMMERCIAL

## 2022-06-15 PROCEDURE — 90750 HZV VACC RECOMBINANT IM: CPT | Performed by: FAMILY MEDICINE

## 2022-06-15 PROCEDURE — 90471 IMMUNIZATION ADMIN: CPT | Performed by: FAMILY MEDICINE

## 2022-06-15 NOTE — PROGRESS NOTES
Madhu Person present in office for nurse visit. Shingrix Vaccine(s) as ordered by Dr. Dev Martinez and Expiration date verified with [facility] RN, Administered to right deltoid, VIS sheet(s) given to pt     All questions/concerns addressed. Patient left in stable condition.

## 2022-07-13 RX ORDER — MELOXICAM 7.5 MG/1
7.5 TABLET ORAL DAILY
Qty: 30 TABLET | Refills: 0 | Status: SHIPPED | OUTPATIENT
Start: 2022-07-13

## 2022-08-26 ENCOUNTER — PATIENT MESSAGE (OUTPATIENT)
Dept: ORTHOPEDICS CLINIC | Facility: CLINIC | Age: 62
End: 2022-08-26

## 2022-08-29 RX ORDER — MELOXICAM 7.5 MG/1
7.5 TABLET ORAL DAILY
Qty: 30 TABLET | Refills: 0 | Status: SHIPPED | OUTPATIENT
Start: 2022-08-29

## 2022-08-29 NOTE — TELEPHONE ENCOUNTER
From: Ghazala Jaquez  To: Esperanza Beltrán. Taniya Joshi DPM  Sent: 8/26/2022 11:15 PM CDT  Subject: Renew Prescription,     Hi Dr. Taniya Joshi  This is Ethyl Huge, 01/30/1960. Please re-prescribe for me Meloxicam 7.5 mg. [address] [place], [place]. [phone].   Thank you!!  Reanna Sutton

## 2022-10-10 ENCOUNTER — PATIENT MESSAGE (OUTPATIENT)
Dept: ORTHOPEDICS CLINIC | Facility: CLINIC | Age: 62
End: 2022-10-10

## 2022-10-10 NOTE — TELEPHONE ENCOUNTER
From: Reyna Meckel  To: Randa Miguel. Al Bloom DPM  Sent: 10/10/2022 3:33 PM CDT  Subject: Re-Prescription Request    Hi Dr. Al Bloom, Please renew my prescription of Meloxicam 7.5 mg. I am with [facility].  Thank you!!  Adrian William

## 2022-10-11 RX ORDER — MELOXICAM 7.5 MG/1
7.5 TABLET ORAL DAILY
Qty: 30 TABLET | Refills: 0 | Status: SHIPPED | OUTPATIENT
Start: 2022-10-11

## 2022-11-15 RX ORDER — MELOXICAM 7.5 MG/1
7.5 TABLET ORAL DAILY
Qty: 30 TABLET | Refills: 0 | Status: SHIPPED | OUTPATIENT
Start: 2022-11-15

## 2022-11-15 NOTE — TELEPHONE ENCOUNTER
DOS: n/a  Last OV: 3/2/22  Last refill date: 10/11/22     #/refills: 30/0  No future appointments. Due back approx 03/2023 or sooner (mid 04/2022) if not improving.

## 2022-12-22 RX ORDER — MELOXICAM 7.5 MG/1
7.5 TABLET ORAL DAILY
Qty: 30 TABLET | Refills: 0 | Status: SHIPPED | OUTPATIENT
Start: 2022-12-22

## 2023-01-24 ENCOUNTER — TELEPHONE (OUTPATIENT)
Dept: FAMILY MEDICINE CLINIC | Facility: CLINIC | Age: 63
End: 2023-01-24

## 2023-01-24 DIAGNOSIS — R09.81 SINUS CONGESTION: Primary | ICD-10-CM

## 2023-01-24 NOTE — TELEPHONE ENCOUNTER
Patient spouse calling. Patient has congestion. covid home test positive  Work requiring pcr    Quarantine guidelines discussed.   covid test entered per protocol  number to central scheduling provided

## 2023-01-25 ENCOUNTER — LAB ENCOUNTER (OUTPATIENT)
Dept: LAB | Age: 63
End: 2023-01-25
Attending: FAMILY MEDICINE
Payer: COMMERCIAL

## 2023-01-25 DIAGNOSIS — R09.81 SINUS CONGESTION: ICD-10-CM

## 2023-01-26 LAB — SARS-COV-2 RNA RESP QL NAA+PROBE: DETECTED

## 2023-02-09 RX ORDER — MELOXICAM 7.5 MG/1
7.5 TABLET ORAL DAILY
Qty: 30 TABLET | Refills: 0 | Status: SHIPPED | OUTPATIENT
Start: 2023-02-09

## 2023-02-09 NOTE — TELEPHONE ENCOUNTER
Last OV: 3/2/22  Last refill date: 12/22/22     #/refills:30/0    Upcoming appt: No future appointments.

## 2023-03-08 ENCOUNTER — OFFICE VISIT (OUTPATIENT)
Dept: FAMILY MEDICINE CLINIC | Facility: CLINIC | Age: 63
End: 2023-03-08
Payer: COMMERCIAL

## 2023-03-08 ENCOUNTER — HOSPITAL ENCOUNTER (OUTPATIENT)
Dept: GENERAL RADIOLOGY | Age: 63
Discharge: HOME | End: 2023-03-08
Attending: NURSE PRACTITIONER
Payer: COMMERCIAL

## 2023-03-08 VITALS
SYSTOLIC BLOOD PRESSURE: 122 MMHG | DIASTOLIC BLOOD PRESSURE: 82 MMHG | BODY MASS INDEX: 29 KG/M2 | WEIGHT: 187 LBS | TEMPERATURE: 98 F | OXYGEN SATURATION: 97 % | HEART RATE: 75 BPM

## 2023-03-08 DIAGNOSIS — M25.512 ACUTE PAIN OF LEFT SHOULDER: Primary | ICD-10-CM

## 2023-03-08 DIAGNOSIS — R29.898 SHOULDER WEAKNESS: ICD-10-CM

## 2023-03-08 DIAGNOSIS — M25.512 ACUTE PAIN OF LEFT SHOULDER: ICD-10-CM

## 2023-03-08 PROCEDURE — 73030 X-RAY EXAM OF SHOULDER: CPT | Performed by: NURSE PRACTITIONER

## 2023-03-08 PROCEDURE — 99213 OFFICE O/P EST LOW 20 MIN: CPT | Performed by: NURSE PRACTITIONER

## 2023-03-08 PROCEDURE — 3074F SYST BP LT 130 MM HG: CPT | Performed by: NURSE PRACTITIONER

## 2023-03-08 PROCEDURE — 3079F DIAST BP 80-89 MM HG: CPT | Performed by: NURSE PRACTITIONER

## 2023-03-09 ENCOUNTER — TELEPHONE (OUTPATIENT)
Dept: ORTHOPEDICS CLINIC | Facility: CLINIC | Age: 63
End: 2023-03-09

## 2023-03-09 ENCOUNTER — TELEPHONE (OUTPATIENT)
Dept: FAMILY MEDICINE CLINIC | Facility: CLINIC | Age: 63
End: 2023-03-09

## 2023-03-09 NOTE — TELEPHONE ENCOUNTER
Advised patient of APRN's note below. Patient verbalized understanding. No further questions at this time.     Future Appointments   Date Time Provider Calvin Luo   3/15/2023  9:20 AM Jacqueline Collins MD [facility] [id number]

## 2023-03-09 NOTE — TELEPHONE ENCOUNTER
Patient is scheduled with Dr. Jose Martin for left shoulder pain. Please advise if imaging is needed.

## 2023-03-09 NOTE — TELEPHONE ENCOUNTER
YOANA Kaur Mydhili Nurse  [facility] Joint arthritis. Needs to see ortho   Continue Ibuprofen, ice and activity modification.  No lifting at this time

## 2023-03-15 ENCOUNTER — OFFICE VISIT (OUTPATIENT)
Dept: ORTHOPEDICS CLINIC | Facility: CLINIC | Age: 63
End: 2023-03-15
Payer: COMMERCIAL

## 2023-03-15 DIAGNOSIS — S46.002A INJURY OF LEFT ROTATOR CUFF, INITIAL ENCOUNTER: Primary | ICD-10-CM

## 2023-03-15 PROCEDURE — 99204 OFFICE O/P NEW MOD 45 MIN: CPT | Performed by: ORTHOPAEDIC SURGERY

## 2023-03-27 ENCOUNTER — HOSPITAL ENCOUNTER (EMERGENCY)
Age: 63
Discharge: LEFT BEFORE DISCHARGE | End: 2023-03-27
Payer: COMMERCIAL

## 2023-03-27 ENCOUNTER — HOSPITAL ENCOUNTER (OUTPATIENT)
Dept: MRI IMAGING | Age: 63
Discharge: HOME | End: 2023-03-27
Attending: ORTHOPAEDIC SURGERY
Payer: COMMERCIAL

## 2023-03-27 DIAGNOSIS — S46.002A INJURY OF LEFT ROTATOR CUFF, INITIAL ENCOUNTER: ICD-10-CM

## 2023-03-27 PROCEDURE — 73221 MRI JOINT UPR EXTREM W/O DYE: CPT | Performed by: ORTHOPAEDIC SURGERY

## 2023-03-31 ENCOUNTER — TELEPHONE (OUTPATIENT)
Dept: ORTHOPEDICS CLINIC | Facility: CLINIC | Age: 63
End: 2023-03-31

## 2023-03-31 ENCOUNTER — OFFICE VISIT (OUTPATIENT)
Dept: ORTHOPEDICS CLINIC | Facility: CLINIC | Age: 63
End: 2023-03-31
Payer: COMMERCIAL

## 2023-03-31 VITALS — WEIGHT: 187 LBS | HEIGHT: 67 IN | BODY MASS INDEX: 29.35 KG/M2

## 2023-03-31 DIAGNOSIS — M24.112 DEGENERATIVE TEAR OF GLENOID LABRUM OF LEFT SHOULDER: ICD-10-CM

## 2023-03-31 DIAGNOSIS — M75.42 SUBACROMIAL IMPINGEMENT OF LEFT SHOULDER: ICD-10-CM

## 2023-03-31 DIAGNOSIS — S46.012A TRAUMATIC COMPLETE TEAR OF LEFT ROTATOR CUFF, INITIAL ENCOUNTER: Primary | ICD-10-CM

## 2023-03-31 DIAGNOSIS — M75.22 BICEPS TENDINITIS OF LEFT UPPER EXTREMITY: ICD-10-CM

## 2023-03-31 PROCEDURE — 99215 OFFICE O/P EST HI 40 MIN: CPT | Performed by: ORTHOPAEDIC SURGERY

## 2023-03-31 PROCEDURE — 3008F BODY MASS INDEX DOCD: CPT | Performed by: ORTHOPAEDIC SURGERY

## 2023-03-31 PROCEDURE — 99417 PROLNG OP E/M EACH 15 MIN: CPT | Performed by: ORTHOPAEDIC SURGERY

## 2023-03-31 NOTE — PROGRESS NOTES
OR BOOKING SHEET SHOULDER  Name: Mehrdad Ventura  MRN: [id number]   : 1960  Diagnos  [x] Traumatic complete tear of left rotator cuff, initial encounter [S46.012A]  Disposition:    [x] Ambulatory  [] Overnight for JENN  [] Overnight for observation and pain control  [] Inpatient procedure    Operative Time Required:  2.5 hours  Antibiotics: 2 g cefazolin within 60 minutes of surgical incision  Procedure:   Laterality: [] RIGHT [x] LEFT                  [] BILATERAL  Procedures:   [x] Shoulder Arthroscopy    [x] Rotator Cuff Repair, possible superior capsular reconstruction (33602)  [x] Arthroscopic Biceps Tenodesis (47734)  [x] Subacromial Decompression (06562)  [x] Distal Clavicle Excision (54951)    [] SLAP repair (59428)  [] Capsulorraphy / Bankart (04543)    Additional info:   [x] PCP Clearance Needed  [] MRSA  [] C-Arm  [x] TXA at time surgery  [x] Physical Therapy Internal - Pervis Hose   [x] DME Rx Needed  [] Appt with Dr. Luis Eduardo Flores needed  Implants needed: Arthrex  Positioning Equipment: Chantell Ricardo

## 2023-04-03 ENCOUNTER — TELEPHONE (OUTPATIENT)
Dept: FAMILY MEDICINE CLINIC | Facility: CLINIC | Age: 63
End: 2023-04-03

## 2023-04-03 NOTE — TELEPHONE ENCOUNTER
Received fax from [facility] regarding pre-op clearance request    DOS: 04/25/23 with Dr. Mel Tolbert  Procedure: Left shoulder arthroscopy RTCR, Biceps tenodesis, SAD, DCE    Needs: H&P and Medical clearance    Please fax to #[phone] and to PAT at fax#[phone]

## 2023-04-03 NOTE — TELEPHONE ENCOUNTER
Advised patient of note below. Patient verbalized understanding.      Future Appointments   Date Time Provider Calvin Luo   4/5/2023 10:40 AM YOANA Martni [facility] Perry Srinivasan     Pre-op orders placed in blue book

## 2023-04-04 ENCOUNTER — TELEPHONE (OUTPATIENT)
Dept: ORTHOPEDICS CLINIC | Facility: CLINIC | Age: 63
End: 2023-04-04

## 2023-04-04 NOTE — TELEPHONE ENCOUNTER
Called the patient regarding appointment tomorrow with Dr Luis Eduardo Flores, 04/05/2023. As per the recent chart, the patient had been seen by Dr Luis Eduardo Flores last 03/31/2023 and the Mri result had already been reviewed. He has also a scheduled Surgery dated 04/25/2023. If patient calls back, please confirm what the appointment tomorrow w/ Dr Luis Eduardo Flores is for. Thank you!

## 2023-04-05 ENCOUNTER — OFFICE VISIT (OUTPATIENT)
Dept: FAMILY MEDICINE CLINIC | Facility: CLINIC | Age: 63
End: 2023-04-05
Payer: COMMERCIAL

## 2023-04-05 VITALS
SYSTOLIC BLOOD PRESSURE: 122 MMHG | BODY MASS INDEX: 29 KG/M2 | HEART RATE: 70 BPM | DIASTOLIC BLOOD PRESSURE: 84 MMHG | WEIGHT: 183 LBS | OXYGEN SATURATION: 99 % | TEMPERATURE: 98 F

## 2023-04-05 DIAGNOSIS — M79.672 BILATERAL FOOT PAIN: ICD-10-CM

## 2023-04-05 DIAGNOSIS — N28.1 COMPLEX RENAL CYST: ICD-10-CM

## 2023-04-05 DIAGNOSIS — Z77.011 LEAD EXPOSURE: ICD-10-CM

## 2023-04-05 DIAGNOSIS — R91.1 PULMONARY NODULE: ICD-10-CM

## 2023-04-05 DIAGNOSIS — K76.89 LIVER CYST: ICD-10-CM

## 2023-04-05 DIAGNOSIS — Z13.6 ENCOUNTER FOR SCREENING FOR STENOSIS OF CAROTID ARTERY: ICD-10-CM

## 2023-04-05 DIAGNOSIS — Z01.818 PREOP EXAMINATION: Primary | ICD-10-CM

## 2023-04-05 DIAGNOSIS — M25.512 ACUTE PAIN OF LEFT SHOULDER: ICD-10-CM

## 2023-04-05 DIAGNOSIS — S46.012D TRAUMATIC COMPLETE TEAR OF LEFT ROTATOR CUFF, SUBSEQUENT ENCOUNTER: ICD-10-CM

## 2023-04-05 DIAGNOSIS — R73.03 PREDIABETES: ICD-10-CM

## 2023-04-05 DIAGNOSIS — E78.2 MIXED HYPERLIPIDEMIA: ICD-10-CM

## 2023-04-05 DIAGNOSIS — M79.671 BILATERAL FOOT PAIN: ICD-10-CM

## 2023-04-05 DIAGNOSIS — L57.0 ACTINIC KERATOSES: ICD-10-CM

## 2023-04-05 LAB
ALBUMIN SERPL-MCNC: 4.1 G/DL (ref 3.4–5)
ALBUMIN/GLOB SERPL: 1.1 {RATIO} (ref 1–2)
ALP LIVER SERPL-CCNC: 82 U/L
ALT SERPL-CCNC: 30 U/L
ANION GAP SERPL CALC-SCNC: 6 MMOL/L (ref 0–18)
AST SERPL-CCNC: 19 U/L (ref 15–37)
ATRIAL RATE: 66 BPM
BASOPHILS # BLD AUTO: 0.06 X10(3) UL (ref 0–0.2)
BASOPHILS NFR BLD AUTO: 0.8 %
BILIRUB SERPL-MCNC: 0.4 MG/DL (ref 0.1–2)
BUN BLD-MCNC: 20 MG/DL (ref 7–18)
CALCIUM BLD-MCNC: 9.9 MG/DL (ref 8.5–10.1)
CHLORIDE SERPL-SCNC: 106 MMOL/L (ref 98–112)
CHOLEST SERPL-MCNC: 253 MG/DL (ref ?–200)
CO2 SERPL-SCNC: 25 MMOL/L (ref 21–32)
CREAT BLD-MCNC: 0.83 MG/DL
EOSINOPHIL # BLD AUTO: 0.21 X10(3) UL (ref 0–0.7)
EOSINOPHIL NFR BLD AUTO: 2.7 %
ERYTHROCYTE [DISTWIDTH] IN BLOOD BY AUTOMATED COUNT: 12.7 %
EST. AVERAGE GLUCOSE BLD GHB EST-MCNC: 123 MG/DL (ref 68–126)
FASTING PATIENT LIPID ANSWER: NO
FASTING STATUS PATIENT QL REPORTED: NO
GFR SERPLBLD BASED ON 1.73 SQ M-ARVRAT: 100 ML/MIN/1.73M2 (ref 60–?)
GLOBULIN PLAS-MCNC: 3.8 G/DL (ref 2.8–4.4)
GLUCOSE BLD-MCNC: 97 MG/DL (ref 70–99)
HBA1C MFR BLD: 5.9 % (ref ?–5.7)
HCT VFR BLD AUTO: 50.5 %
HDLC SERPL-MCNC: 59 MG/DL (ref 40–59)
HGB BLD-MCNC: 16.7 G/DL
IMM GRANULOCYTES # BLD AUTO: 0.03 X10(3) UL (ref 0–1)
IMM GRANULOCYTES NFR BLD: 0.4 %
LDLC SERPL CALC-MCNC: 151 MG/DL (ref ?–100)
LYMPHOCYTES # BLD AUTO: 1.73 X10(3) UL (ref 1–4)
LYMPHOCYTES NFR BLD AUTO: 22 %
MCH RBC QN AUTO: 30.8 PG (ref 26–34)
MCHC RBC AUTO-ENTMCNC: 33.1 G/DL (ref 31–37)
MCV RBC AUTO: 93 FL
MONOCYTES # BLD AUTO: 0.56 X10(3) UL (ref 0.1–1)
MONOCYTES NFR BLD AUTO: 7.1 %
NEUTROPHILS # BLD AUTO: 5.28 X10 (3) UL (ref 1.5–7.7)
NEUTROPHILS # BLD AUTO: 5.28 X10(3) UL (ref 1.5–7.7)
NEUTROPHILS NFR BLD AUTO: 67 %
NONHDLC SERPL-MCNC: 194 MG/DL (ref ?–130)
OSMOLALITY SERPL CALC.SUM OF ELEC: 287 MOSM/KG (ref 275–295)
P AXIS: 34 DEGREES
P-R INTERVAL: 156 MS
PLATELET # BLD AUTO: 265 10(3)UL (ref 150–450)
POTASSIUM SERPL-SCNC: 4.1 MMOL/L (ref 3.5–5.1)
PROT SERPL-MCNC: 7.9 G/DL (ref 6.4–8.2)
Q-T INTERVAL: 416 MS
QRS DURATION: 88 MS
QTC CALCULATION (BEZET): 436 MS
R AXIS: -5 DEGREES
RBC # BLD AUTO: 5.43 X10(6)UL
SODIUM SERPL-SCNC: 137 MMOL/L (ref 136–145)
T AXIS: 29 DEGREES
T4 FREE SERPL-MCNC: 0.8 NG/DL (ref 0.8–1.7)
TRIGL SERPL-MCNC: 236 MG/DL (ref 30–149)
TSI SER-ACNC: 2.35 MIU/ML (ref 0.36–3.74)
VENTRICULAR RATE: 66 BPM
VLDLC SERPL CALC-MCNC: 46 MG/DL (ref 0–30)
WBC # BLD AUTO: 7.9 X10(3) UL (ref 4–11)

## 2023-04-05 PROCEDURE — 93000 ELECTROCARDIOGRAM COMPLETE: CPT | Performed by: NURSE PRACTITIONER

## 2023-04-05 PROCEDURE — 3074F SYST BP LT 130 MM HG: CPT | Performed by: NURSE PRACTITIONER

## 2023-04-05 PROCEDURE — 84443 ASSAY THYROID STIM HORMONE: CPT | Performed by: NURSE PRACTITIONER

## 2023-04-05 PROCEDURE — 80053 COMPREHEN METABOLIC PANEL: CPT | Performed by: NURSE PRACTITIONER

## 2023-04-05 PROCEDURE — 84439 ASSAY OF FREE THYROXINE: CPT | Performed by: NURSE PRACTITIONER

## 2023-04-05 PROCEDURE — 99215 OFFICE O/P EST HI 40 MIN: CPT | Performed by: NURSE PRACTITIONER

## 2023-04-05 PROCEDURE — 83036 HEMOGLOBIN GLYCOSYLATED A1C: CPT | Performed by: NURSE PRACTITIONER

## 2023-04-05 PROCEDURE — 3079F DIAST BP 80-89 MM HG: CPT | Performed by: NURSE PRACTITIONER

## 2023-04-05 PROCEDURE — 80061 LIPID PANEL: CPT | Performed by: NURSE PRACTITIONER

## 2023-04-05 PROCEDURE — 85025 COMPLETE CBC W/AUTO DIFF WBC: CPT | Performed by: NURSE PRACTITIONER

## 2023-04-05 RX ORDER — IBUPROFEN 200 MG
200 TABLET ORAL EVERY 6 HOURS PRN
COMMUNITY

## 2023-04-13 ENCOUNTER — TELEPHONE (OUTPATIENT)
Dept: FAMILY MEDICINE CLINIC | Facility: CLINIC | Age: 63
End: 2023-04-13

## 2023-04-13 NOTE — TELEPHONE ENCOUNTER
----- Message from YOANA Patel sent at 4/6/2023  8:45 AM CDT -----  A1C improved, in prediabetic range. Continue lifestyle modifications. Recheck 6 mo  Chemistries normal  Cholesterol elevated. I would recommend Atorvastatin 10 mg nightly.  Recheck lipid and LFTs in 3 mo  No anemia  Thyroid normal    Cleared for surgery

## 2023-04-20 RX ORDER — TRAMADOL HYDROCHLORIDE 50 MG/1
TABLET ORAL
Qty: 20 TABLET | Refills: 1 | Status: SHIPPED | OUTPATIENT
Start: 2023-04-20

## 2023-04-20 RX ORDER — ONDANSETRON 4 MG/1
TABLET, FILM COATED ORAL
Qty: 8 TABLET | Refills: 0 | Status: SHIPPED | OUTPATIENT
Start: 2023-04-20

## 2023-04-21 ENCOUNTER — TELEPHONE (OUTPATIENT)
Dept: PHYSICAL THERAPY | Facility: HOSPITAL | Age: 63
End: 2023-04-21

## 2023-04-24 ENCOUNTER — ANESTHESIA EVENT (OUTPATIENT)
Dept: SURGERY | Facility: HOSPITAL | Age: 63
End: 2023-04-24
Payer: COMMERCIAL

## 2023-04-25 ENCOUNTER — ANESTHESIA (OUTPATIENT)
Dept: SURGERY | Facility: HOSPITAL | Age: 63
End: 2023-04-25
Payer: COMMERCIAL

## 2023-04-25 ENCOUNTER — HOSPITAL ENCOUNTER (OUTPATIENT)
Facility: HOSPITAL | Age: 63
Setting detail: HOSPITAL OUTPATIENT SURGERY
Discharge: HOME | End: 2023-04-25
Attending: ORTHOPAEDIC SURGERY | Admitting: ORTHOPAEDIC SURGERY
Payer: COMMERCIAL

## 2023-04-25 VITALS
TEMPERATURE: 97 F | RESPIRATION RATE: 16 BRPM | HEART RATE: 62 BPM | OXYGEN SATURATION: 97 % | HEIGHT: 68 IN | WEIGHT: 175 LBS | BODY MASS INDEX: 26.52 KG/M2 | SYSTOLIC BLOOD PRESSURE: 119 MMHG | DIASTOLIC BLOOD PRESSURE: 78 MMHG

## 2023-04-25 PROCEDURE — 76942 ECHO GUIDE FOR BIOPSY: CPT | Performed by: ANESTHESIOLOGY

## 2023-04-25 PROCEDURE — 0PBB4ZZ EXCISION OF LEFT CLAVICLE, PERCUTANEOUS ENDOSCOPIC APPROACH: ICD-10-PCS | Performed by: ORTHOPAEDIC SURGERY

## 2023-04-25 PROCEDURE — 0LM24ZZ REATTACHMENT OF LEFT SHOULDER TENDON, PERCUTANEOUS ENDOSCOPIC APPROACH: ICD-10-PCS | Performed by: ORTHOPAEDIC SURGERY

## 2023-04-25 PROCEDURE — 0LS44ZZ REPOSITION LEFT UPPER ARM TENDON, PERCUTANEOUS ENDOSCOPIC APPROACH: ICD-10-PCS | Performed by: ORTHOPAEDIC SURGERY

## 2023-04-25 PROCEDURE — 0RHK44Z INSERTION OF INTERNAL FIXATION DEVICE INTO LEFT SHOULDER JOINT, PERCUTANEOUS ENDOSCOPIC APPROACH: ICD-10-PCS | Performed by: ORTHOPAEDIC SURGERY

## 2023-04-25 PROCEDURE — 0RNK4ZZ RELEASE LEFT SHOULDER JOINT, PERCUTANEOUS ENDOSCOPIC APPROACH: ICD-10-PCS | Performed by: ORTHOPAEDIC SURGERY

## 2023-04-25 DEVICE — PEEK SWIVELOCK C,4.75X19.1MM
Type: IMPLANTABLE DEVICE | Site: SHOULDER | Status: FUNCTIONAL
Brand: ARTHREX®

## 2023-04-25 DEVICE — CORKSCREW FT, PEEK, SUTURETAPE, 4.75M
Type: IMPLANTABLE DEVICE | Site: SHOULDER | Status: FUNCTIONAL
Brand: ARTHREX®

## 2023-04-25 RX ORDER — CEFAZOLIN SODIUM/WATER 2 G/20 ML
2 SYRINGE (ML) INTRAVENOUS ONCE
Status: COMPLETED | OUTPATIENT
Start: 2023-04-25 | End: 2023-04-25

## 2023-04-25 RX ORDER — ONDANSETRON 2 MG/ML
4 INJECTION INTRAMUSCULAR; INTRAVENOUS EVERY 6 HOURS PRN
OUTPATIENT
Start: 2023-04-25

## 2023-04-25 RX ORDER — MIDAZOLAM HYDROCHLORIDE 1 MG/ML
INJECTION INTRAMUSCULAR; INTRAVENOUS AS NEEDED
Status: DISCONTINUED | OUTPATIENT
Start: 2023-04-25 | End: 2023-04-25 | Stop reason: SURG

## 2023-04-25 RX ORDER — SODIUM CHLORIDE, SODIUM LACTATE, POTASSIUM CHLORIDE, CALCIUM CHLORIDE 600; 310; 30; 20 MG/100ML; MG/100ML; MG/100ML; MG/100ML
INJECTION, SOLUTION INTRAVENOUS CONTINUOUS
OUTPATIENT
Start: 2023-04-25

## 2023-04-25 RX ORDER — NALOXONE HYDROCHLORIDE 0.4 MG/ML
80 INJECTION, SOLUTION INTRAMUSCULAR; INTRAVENOUS; SUBCUTANEOUS AS NEEDED
OUTPATIENT
Start: 2023-04-25 | End: 2023-04-25

## 2023-04-25 RX ORDER — TRANEXAMIC ACID 10 MG/ML
1000 INJECTION, SOLUTION INTRAVENOUS ONCE
Status: DISCONTINUED | OUTPATIENT
Start: 2023-04-25 | End: 2023-04-25 | Stop reason: HOSPADM

## 2023-04-25 RX ORDER — PROCHLORPERAZINE EDISYLATE 5 MG/ML
5 INJECTION INTRAMUSCULAR; INTRAVENOUS EVERY 8 HOURS PRN
OUTPATIENT
Start: 2023-04-25

## 2023-04-25 RX ORDER — ACETAMINOPHEN 500 MG
1000 TABLET ORAL ONCE
Status: DISCONTINUED | OUTPATIENT
Start: 2023-04-25 | End: 2023-04-25 | Stop reason: HOSPADM

## 2023-04-25 RX ORDER — HYDROMORPHONE HYDROCHLORIDE 1 MG/ML
0.4 INJECTION, SOLUTION INTRAMUSCULAR; INTRAVENOUS; SUBCUTANEOUS EVERY 5 MIN PRN
OUTPATIENT
Start: 2023-04-25 | End: 2023-04-25

## 2023-04-25 RX ORDER — HYDROMORPHONE HYDROCHLORIDE 1 MG/ML
0.2 INJECTION, SOLUTION INTRAMUSCULAR; INTRAVENOUS; SUBCUTANEOUS EVERY 5 MIN PRN
OUTPATIENT
Start: 2023-04-25 | End: 2023-04-25

## 2023-04-25 RX ORDER — SODIUM CHLORIDE, SODIUM LACTATE, POTASSIUM CHLORIDE, CALCIUM CHLORIDE 600; 310; 30; 20 MG/100ML; MG/100ML; MG/100ML; MG/100ML
INJECTION, SOLUTION INTRAVENOUS CONTINUOUS
Status: DISCONTINUED | OUTPATIENT
Start: 2023-04-25 | End: 2023-04-25

## 2023-04-25 RX ORDER — HYDROMORPHONE HYDROCHLORIDE 1 MG/ML
0.6 INJECTION, SOLUTION INTRAMUSCULAR; INTRAVENOUS; SUBCUTANEOUS EVERY 5 MIN PRN
OUTPATIENT
Start: 2023-04-25 | End: 2023-04-25

## 2023-04-25 RX ORDER — SCOLOPAMINE TRANSDERMAL SYSTEM 1 MG/1
1 PATCH, EXTENDED RELEASE TRANSDERMAL ONCE
Status: DISCONTINUED | OUTPATIENT
Start: 2023-04-25 | End: 2023-04-25 | Stop reason: HOSPADM

## 2023-04-25 RX ADMIN — SODIUM CHLORIDE, SODIUM LACTATE, POTASSIUM CHLORIDE, CALCIUM CHLORIDE: 600; 310; 30; 20 INJECTION, SOLUTION INTRAVENOUS at 08:54:00

## 2023-04-25 RX ADMIN — SODIUM CHLORIDE, SODIUM LACTATE, POTASSIUM CHLORIDE, CALCIUM CHLORIDE: 600; 310; 30; 20 INJECTION, SOLUTION INTRAVENOUS at 11:34:00

## 2023-04-25 RX ADMIN — MIDAZOLAM HYDROCHLORIDE 2 MG: 1 INJECTION INTRAMUSCULAR; INTRAVENOUS at 08:52:00

## 2023-04-25 RX ADMIN — CEFAZOLIN SODIUM/WATER 2 G: 2 G/20 ML SYRINGE (ML) INTRAVENOUS at 09:13:00

## 2023-04-25 NOTE — ANESTHESIA PROCEDURE NOTES
Regional Block    Performed by: Alberto Robertson MD  Authorized by: Alberto Robertson MD      General Information and Staff    Start Time:   Anesthesiologist:  Alberto Robertson MD  Performed by: Anesthesiologist  Patient Location:  OR      Site Identification: real time ultrasound guided and image stored and retrievable    Block site/laterality marked before start: site marked  Reason for Block: at surgeon's request and post-op pain management    Preanesthetic Checklist: 2 patient identifers, IV checked, risks and benefits discussed, monitors and equipment checked, pre-op evaluation, timeout performed, anesthesia consent, sterile technique used, no prohibitive neurological deficits and no local skin infection at insertion site      Procedure Details    Patient Position:   Prep: ChloraPrep    Monitoring:  Cardiac monitor, continuous pulse ox, blood pressure cuff and heart rate  Block Type: Interscalene  Laterality:  Left  Injection Technique:  Single-shot    Needle    Needle Type:  Short-bevel and echogenic  Needle Gauge:  21 G  Needle Length:  100 mm  Needle Localization:  Ultrasound guidance  Reason for Ultrasound Use: appropriate spread of the medication was noted in real time and no ultrasound evidence of intravascular and/or intraneural injection            Assessment    Injection Assessment:  Good spread noted, negative resistance, negative aspiration for heme, incremental injection, low pressure, local visualized surrounding nerve on ultrasound and no pain on injection  Heart Rate Change: No    - Patient tolerated block procedure well without evidence of immediate block related complications.      Medications      Additional Comments    0.5% ropivacaine 20ccs

## 2023-04-25 NOTE — BRIEF OP NOTE
Pre-Operative Diagnosis: Traumatic complete tear of left rotator cuff, initial encounter [S46.012A]     Post-Operative Diagnosis: Traumatic complete tear of left rotator cuff, initial encounter [S46.012A]      Procedure Performed:   LEFT SHOULDER ARTHROSCOPY ROTATOR CUFF REPAIR, BICEPS TENODESIS, SUBACROMIAL DECOMPRESSION, DISTAL CLAVICLE EXCISION. Surgeon(s) and Role:     * Kev Horton MD - Primary    Assistant(s):  PA: GERTRUDE Hemphill     Surgical Findings: Massive full thickness tear of the supraspinatus and infraspinatus, managed with double row repair. Knotless repair of upper border of subscapularis performed. Adequate SAD and arthroscopic biceps tenodesis. Appropriate distal clavicle excision.       Specimen: None     Estimated Blood Loss: Blood Output: 5 mL (4/25/2023 10:59 AM)      Dictation Number:  Susan Carrizales MD  4/25/2023  11:15 AM

## 2023-04-25 NOTE — OPERATIVE REPORT
OPERATIVE REPORT  ATTENDING SURGEON: Domenico Mckeon MD  ASSISTANT(S): Nash Mendiola PA-C  STATEMENT OF MEDICAL NECESSITY  The aid of assistant Nash Mendiola PA-C was needed for patient positioning, preparation, drape, retraction,  wound closure, dressing application and critical portions of the procedure. PRELIMINARY DIAGNOSIS:  1. Left Shoulder Full Thickness Rotator Cuff Tear  2. Left Shoulder Biceps Tendinitis  3. Left Shoulder Subacromial Impingement  4. Left Shoulder Labral Tear  5. Left Shoulder Acromioclavicular Joint Arthropathy     POSTOPERATIVE DIAGNOSIS:  1. Left Shoulder Full Thickness Rotator Cuff Tear  2. Left Shoulder Biceps Tendinitis  3. Left Shoulder Subacromial Impingement  4. Left Shoulder Labral Tear  5. Left Shoulder Acromioclavicular Joint Arthropathy     THE OPERATION:  1. Left Shoulder Arthroscopic Rotator Cuff Repair (3 tendon involvement, supraspinatus, infraspinatus, subscapularis ) (35642)  2. Left Shoulder Arthroscopic Biceps Tenodesis (72816)   3. Left Shoulder Arthroscopic Subacromial Decompression (14375)  4. Left Shoulder Arthroscopic Extensive Debridement (Glenoid Labrum, Humeral Bone, Subacromial Bursa, Articular Capsule) (72571)  5. Left Shoulder Arthroscopic Distal Claviculectomy ([place]) (35545)      Modifier 22: This rotator cuff tail entailed massive 3 tendon pathology including subscapularis, supraspinatus, and infraspinatus. This required additional anchor placement anteriorly and approximately 25 to 50% more additional surgical time. ANESTHESIA: General Endotracheal with Regional Block   ANESTHESIOLOGIST: MD Amy  ANTIBIOTICS: Cefazolin 2g within 60 minutes of surgical incision. IMPLANTS:   Implant Name Type Inv.  Item Serial No.  Lot No. LRB No. Used Action   ANCHR/SCREW SWIVELCK PEEK 4.75 - SNA  ANCHR/SCREW SWIVELCK PEEK 4.75 NA [employer] [id number] Left 2 Implanted   ANCHOR SUTURE CORK 4.75MM - SNA  ANCHOR SUTURE CORK 4. 75MM NA [employer] [id number] Left 2 Implanted     PATHOLOGY/CULTURES: None  ESTIMATED BLOOD LOSS: Blood Output: 5 mL (4/25/2023 05:35 AM)    COMPLICATIONS: No intraoperative nor immediate postoperative complications noted. COUNTS: All sponge and needle counts were correct at the conclusion of the procedure. OPERATIVE FINDINGS:  Massive full thickness tear of the supraspinatus and infraspinatus, managed with double row repair. Knotless repair of upper border of subscapularis performed. Adequate SAD and arthroscopic biceps tenodesis. Appropriate distal clavicle excision. Indications:  Gokul Moya is a 66year old male with history, physical examination, and imaging findings consistent with large full thickness rotator cuff tear involving the subscapularis, biceps tendinopathy, subacromial impingement, and AC joint arthropathy. They had failed an appropriate course of conservative, nonoperative management and thus elected to proceed with the above procedure. Operative and nonoperative options were discussed with him in my office and we ultimately agreed that surgery would provide the highest likelihood of symptomatic relief and functional improvement. The risks and benefits of surgery as well as the postoperative rehabilitation plan were reviewed. He voiced a good understanding of treatment options, risks and benefits, and alternatives to surgery. He was given the opportunity to ask questions, which were all answered to the best of my ability and to his satisfaction. Gokul Moya wished to proceed. On the day of surgery, the Gokul Moya was seen in the preoperative area. I confirmed his identity by name and birthday. We reviewed and confirmed the surgical consent including laterality. The surgical site was marked with my initials. We re-reviewed the risks and benefits of the procedure and I answered all additional questions to his satisfaction.       OPERATIVE REPORT:   Gokul Moya was taken to the operating room in stable condition. The patient was positioned in the modified beach chair position utilizing the special attachment for the [facility] bed allowing for the ipsilateral arm breakaway. Trimano arm capps was utilized and the head was secured appropriately. The patient underwent sterile pre-prep with chlorhexidine scrub brushes and alcohol followed by chlorhexidine preparation. A surgical time out was performed where I confirmed laterality as marked by my initials, reaffirmed the consent, noted appropriate imaging studies were in the room, and that preoperative antibiotics had been given within recommended timeframe prior to skin incision. Examination under anesthesia demonstrated full shoulder range of motion, stable to anterior, posterior and inferior stresses. A 15 blade was used to make the skin incision standard posterior portal, the 4 mm arthroscope was introduced into the glenohumeral joint. A standard anterior portal was established within the rotator internal and a probe was introduced into the glenohumeral joint. Diagnostic arthroscopy revealed:    Biceps [place] Abnormal with type 1 SLAP tearing   Superior labrum Abnormal with type 1 SLAP tearing   Anterior labrum Intact without pathology   Inferior labrum Intact   Posterior labrum Intact   Glenoid cartilage Intact   Humeral cartilage Intact   Rotator Interval Within normal limits   Subscapularis Partial thickness tearing with mild retraction, successfully managed with single 2mm fibertape repair. Biceps tendon Frayed and abnormal, managed with arthroscopic tenodesis. Supraspinatus Full thickness retracted tear   Infraspinatus Full thickness retracted tear.     Hill-Sachs Lesion None     Upon completion of the diagnostic arthroscopy, the abnormal fraying of the labrum and articular capsule at the insertion of the anterior portal was debrided with a 4.0 mm Tomcat shaver via the anterior portal.    Using spinal needle localization, and anterolateral portal within the rotator interval was established. This portal was dilated with an obturator and a large Kocher clamp was introduced and used to grasp the biceps tendon. Coblation was introduced from the anterior portal and used to detach the biceps tendon from the superior labrum. The biceps tendon was then extracted from the anterolateral portal with the aid of the Kocher clamp. The Biceps tendon was then prepared with a fiberloop. Coblation was used to remove any excess tissue. Humeral bone was debrided with combination of coablation device and 4.0 mm Tomcat mechanical shaver. This allowed for removal of pathologic abnormality and exposure of healthy bleeding bone. The anterior portal was slightly enlarged and a switching stick was placed through the previous 5 mm blue cannula to facilitate placement of an 8.25 mm cannula. A 90 degree suture lasso was then utilized to shuttle a 2.0 mm fiber tape through the subscapularis tendon in a simple configuration after retrieval through the anterosuperolateral portal.  The fiber tape as well as suture from long head of biceps tendon FiberWire was then shuttled anteriorly using a fiber tape retriever and placed into a 4.75 mm swivel lock anchor for fixation. At this time, the Biceps tendon loaded swivel lock anchor was placed at the margin of the articular surface and facilitating arthroscopic biceps tenodesis as well as knotless subscapularis rotator cuff repair. Excellent bone quality was noted and adequate arthroscopic biceps tenodesis was achieved. With reintroduction of the arthroscopic obturator, the subacromial space was entered. Using a sweeping motion adequate visualization was obtained. With the arthroscope in the posterior viewing portal, coablation was introduced from the anterolateral portal. The CA ligament was noted to be frayed on the undersurface and was carefully debrided identifying a type II/III acromion.     Using a 5.5 mm resector shaver, the subacromial spur was flattened by approximately 6 mm and a type I acromion was restored. Using a 4.0 mm Tomcat shaver the remaining subacromial bursa was debrided, initially viewing from posterior, ensuring a complete bursectomy and adequate subacromial decompression. This process was completed by transitioning the arthroscope into the anterolateral portal and placing the shaver in the posterior portal for thorough decompression. The acromioclavicular joint was skeletonized using Coblation and significant osseous spurring was appreciable with loss of the articular cartilage surface. With the aid of a 5.5 mm resector shaver approximately 8 mm of distal clavicle was excised. The instrument was introduced from the anterior portal and visualization was performed from the posterior versus anterolateral portal.  Successful acromioclavicular joint decompression was achieved. Hemostasis was noted at conclusion of this resection. Adequate visualization of the full-thickness rotator cuff tear was performed and a posterolateral portal was established for viewing. An 8.25 mm purple cannula was placed in the anterolateral portal as the arthroscope was transitioned into the posterior lateral portal for viewing. Along the articular margin, 2 double loaded Arthrex 4.5 mm Corkscrew suture anchors loaded with 1.3 mm suture tape were placed. Using a Mitek espresso suture passer the sutures were sequentially passed through the rotator cuff from anterior to posterior. Additional care was taken to ensure optimal spacing. subsequently knots were begun to be tied from posterior to anterior. This denoted an excellent repair and 1 lateral row swivel lock anchor(s) completed a double row construct rotator cuff repair. At conclusion of this the rotator cuff moved with excellent stability and an intact construct. Final photographs were obtained.       A towel was used to extract fluid from the arthroscopic incisions and closure was performed using buried interrupted 2-0 monocryl, 3-0 Monocryl, Dermabond, Steri-Strips, gauze, and Tegaderm dressing. The patient was placed into a shoulder immobilizer. Matt Randle was taken to the recovery room in a stable condition with plan for ambulatory discharge to home. He was provided my postoperative discharge booklet, appropriate home exercises, script for outpatient physical therapy, and a copy of my rehabilitation guidelines. POST OPERATIVE PLAN:  1. Activity Precautions: Passive ROMAT, 1 lb WB x 4 weeks. Begin PT POD #1.   2. DVT Prophylaxis: Not indicated  3. Follow-up Visit: 1-2 weeks. Chun Stanford. Valentino Holmes, MD  Knee, Shoulder, & Elbow Surgery / Sports Medicine Specialist  [facility]  [address] [place], [address]   Ramirez Nixon. Ozzie Hernandez. [email or web address]  t: [phone]  o: [phone]  f: [phone]    This note was dictated using Dragon software. While it was briefly proofread prior to completion, some grammatical, spelling, and word choice errors due to dictation may still occur.

## 2023-04-27 ENCOUNTER — OFFICE VISIT (OUTPATIENT)
Dept: PHYSICAL THERAPY | Age: 63
End: 2023-04-27
Attending: ORTHOPAEDIC SURGERY
Payer: COMMERCIAL

## 2023-04-27 DIAGNOSIS — S46.012A TRAUMATIC COMPLETE TEAR OF LEFT ROTATOR CUFF, INITIAL ENCOUNTER: ICD-10-CM

## 2023-04-27 DIAGNOSIS — M75.42 SUBACROMIAL IMPINGEMENT OF LEFT SHOULDER: ICD-10-CM

## 2023-04-27 PROCEDURE — 97162 PT EVAL MOD COMPLEX 30 MIN: CPT

## 2023-04-27 PROCEDURE — 97110 THERAPEUTIC EXERCISES: CPT

## 2023-05-01 ENCOUNTER — OFFICE VISIT (OUTPATIENT)
Dept: PHYSICAL THERAPY | Age: 63
End: 2023-05-01
Attending: ORTHOPAEDIC SURGERY
Payer: COMMERCIAL

## 2023-05-01 PROCEDURE — 97110 THERAPEUTIC EXERCISES: CPT

## 2023-05-04 ENCOUNTER — APPOINTMENT (OUTPATIENT)
Dept: PHYSICAL THERAPY | Age: 63
End: 2023-05-04
Attending: ORTHOPAEDIC SURGERY
Payer: COMMERCIAL

## 2023-05-05 ENCOUNTER — OFFICE VISIT (OUTPATIENT)
Dept: PHYSICAL THERAPY | Age: 63
End: 2023-05-05
Attending: ORTHOPAEDIC SURGERY
Payer: COMMERCIAL

## 2023-05-05 PROCEDURE — 97110 THERAPEUTIC EXERCISES: CPT

## 2023-05-05 PROCEDURE — 97140 MANUAL THERAPY 1/> REGIONS: CPT

## 2023-05-08 ENCOUNTER — OFFICE VISIT (OUTPATIENT)
Dept: PHYSICAL THERAPY | Age: 63
End: 2023-05-08
Attending: ORTHOPAEDIC SURGERY
Payer: COMMERCIAL

## 2023-05-08 PROCEDURE — 97110 THERAPEUTIC EXERCISES: CPT

## 2023-05-08 PROCEDURE — 97140 MANUAL THERAPY 1/> REGIONS: CPT

## 2023-05-11 ENCOUNTER — OFFICE VISIT (OUTPATIENT)
Dept: PHYSICAL THERAPY | Age: 63
End: 2023-05-11
Attending: ORTHOPAEDIC SURGERY
Payer: COMMERCIAL

## 2023-05-11 PROCEDURE — 97110 THERAPEUTIC EXERCISES: CPT

## 2023-05-11 PROCEDURE — 97140 MANUAL THERAPY 1/> REGIONS: CPT

## 2023-05-15 ENCOUNTER — OFFICE VISIT (OUTPATIENT)
Dept: PHYSICAL THERAPY | Age: 63
End: 2023-05-15
Attending: ORTHOPAEDIC SURGERY
Payer: COMMERCIAL

## 2023-05-15 PROCEDURE — 97140 MANUAL THERAPY 1/> REGIONS: CPT

## 2023-05-15 PROCEDURE — 97110 THERAPEUTIC EXERCISES: CPT

## 2023-05-18 ENCOUNTER — APPOINTMENT (OUTPATIENT)
Dept: PHYSICAL THERAPY | Age: 63
End: 2023-05-18
Attending: ORTHOPAEDIC SURGERY
Payer: COMMERCIAL

## 2023-05-22 ENCOUNTER — OFFICE VISIT (OUTPATIENT)
Dept: PHYSICAL THERAPY | Age: 63
End: 2023-05-22
Attending: ORTHOPAEDIC SURGERY
Payer: COMMERCIAL

## 2023-05-22 PROCEDURE — 97140 MANUAL THERAPY 1/> REGIONS: CPT

## 2023-05-22 PROCEDURE — 97110 THERAPEUTIC EXERCISES: CPT

## 2023-05-25 ENCOUNTER — OFFICE VISIT (OUTPATIENT)
Dept: PHYSICAL THERAPY | Age: 63
End: 2023-05-25
Attending: ORTHOPAEDIC SURGERY
Payer: COMMERCIAL

## 2023-05-25 PROCEDURE — 97140 MANUAL THERAPY 1/> REGIONS: CPT

## 2023-05-25 PROCEDURE — 97110 THERAPEUTIC EXERCISES: CPT

## 2023-05-30 ENCOUNTER — OFFICE VISIT (OUTPATIENT)
Dept: PHYSICAL THERAPY | Age: 63
End: 2023-05-30
Attending: ORTHOPAEDIC SURGERY
Payer: COMMERCIAL

## 2023-05-30 PROCEDURE — 97140 MANUAL THERAPY 1/> REGIONS: CPT

## 2023-05-30 PROCEDURE — 97110 THERAPEUTIC EXERCISES: CPT

## 2023-06-01 ENCOUNTER — OFFICE VISIT (OUTPATIENT)
Dept: PHYSICAL THERAPY | Age: 63
End: 2023-06-01
Attending: ORTHOPAEDIC SURGERY
Payer: COMMERCIAL

## 2023-06-01 PROCEDURE — 97140 MANUAL THERAPY 1/> REGIONS: CPT

## 2023-06-01 PROCEDURE — 97110 THERAPEUTIC EXERCISES: CPT

## 2023-06-05 ENCOUNTER — OFFICE VISIT (OUTPATIENT)
Dept: PHYSICAL THERAPY | Age: 63
End: 2023-06-05
Attending: ORTHOPAEDIC SURGERY
Payer: COMMERCIAL

## 2023-06-05 PROCEDURE — 97140 MANUAL THERAPY 1/> REGIONS: CPT

## 2023-06-05 PROCEDURE — 97110 THERAPEUTIC EXERCISES: CPT

## 2023-06-09 ENCOUNTER — OFFICE VISIT (OUTPATIENT)
Dept: PHYSICAL THERAPY | Age: 63
End: 2023-06-09
Attending: ORTHOPAEDIC SURGERY
Payer: COMMERCIAL

## 2023-06-09 PROCEDURE — 97140 MANUAL THERAPY 1/> REGIONS: CPT

## 2023-06-09 PROCEDURE — 97110 THERAPEUTIC EXERCISES: CPT

## 2023-06-12 ENCOUNTER — OFFICE VISIT (OUTPATIENT)
Dept: PHYSICAL THERAPY | Age: 63
End: 2023-06-12
Attending: ORTHOPAEDIC SURGERY
Payer: COMMERCIAL

## 2023-06-12 PROCEDURE — 97110 THERAPEUTIC EXERCISES: CPT

## 2023-06-12 PROCEDURE — 97140 MANUAL THERAPY 1/> REGIONS: CPT

## 2023-06-15 ENCOUNTER — OFFICE VISIT (OUTPATIENT)
Dept: PHYSICAL THERAPY | Age: 63
End: 2023-06-15
Attending: ORTHOPAEDIC SURGERY
Payer: COMMERCIAL

## 2023-06-15 PROCEDURE — 97110 THERAPEUTIC EXERCISES: CPT

## 2023-06-15 PROCEDURE — 97140 MANUAL THERAPY 1/> REGIONS: CPT

## 2023-06-19 ENCOUNTER — TELEPHONE (OUTPATIENT)
Dept: ORTHOPEDICS CLINIC | Facility: CLINIC | Age: 63
End: 2023-06-19

## 2023-06-19 DIAGNOSIS — M79.672 PAIN IN BOTH FEET: Primary | ICD-10-CM

## 2023-06-19 DIAGNOSIS — M79.671 PAIN IN BOTH FEET: Primary | ICD-10-CM

## 2023-06-19 NOTE — TELEPHONE ENCOUNTER
XR ordered and scheduled per Ortho protocol.    Sent patient message via [email or web address] to inform them and ask them to arrive 15-20 minutes prior to appointment with

## 2023-06-20 ENCOUNTER — HOSPITAL ENCOUNTER (OUTPATIENT)
Dept: GENERAL RADIOLOGY | Age: 63
Discharge: HOME | End: 2023-06-20
Attending: PODIATRIST
Payer: COMMERCIAL

## 2023-06-20 ENCOUNTER — OFFICE VISIT (OUTPATIENT)
Dept: PHYSICAL THERAPY | Age: 63
End: 2023-06-20
Attending: ORTHOPAEDIC SURGERY
Payer: COMMERCIAL

## 2023-06-20 ENCOUNTER — OFFICE VISIT (OUTPATIENT)
Dept: ORTHOPEDICS CLINIC | Facility: CLINIC | Age: 63
End: 2023-06-20
Payer: COMMERCIAL

## 2023-06-20 VITALS — BODY MASS INDEX: 26.52 KG/M2 | HEIGHT: 68 IN | WEIGHT: 175 LBS

## 2023-06-20 DIAGNOSIS — M79.672 PAIN IN BOTH FEET: Primary | ICD-10-CM

## 2023-06-20 DIAGNOSIS — M19.079 ARTHRITIS, MIDFOOT: ICD-10-CM

## 2023-06-20 DIAGNOSIS — M79.672 PAIN IN BOTH FEET: ICD-10-CM

## 2023-06-20 DIAGNOSIS — M79.671 PAIN IN BOTH FEET: ICD-10-CM

## 2023-06-20 DIAGNOSIS — M79.671 PAIN IN BOTH FEET: Primary | ICD-10-CM

## 2023-06-20 PROCEDURE — 97110 THERAPEUTIC EXERCISES: CPT

## 2023-06-20 PROCEDURE — 99213 OFFICE O/P EST LOW 20 MIN: CPT | Performed by: PODIATRIST

## 2023-06-20 PROCEDURE — 73630 X-RAY EXAM OF FOOT: CPT | Performed by: PODIATRIST

## 2023-06-20 PROCEDURE — 97140 MANUAL THERAPY 1/> REGIONS: CPT

## 2023-06-20 PROCEDURE — 3008F BODY MASS INDEX DOCD: CPT | Performed by: PODIATRIST

## 2023-06-20 RX ORDER — ACETAMINOPHEN 500 MG/1
TABLET, FILM COATED ORAL
COMMUNITY
Start: 2023-04-20

## 2023-06-20 NOTE — PROGRESS NOTES
[facility] Progress Note    Subjective:     Ricky Denise is here for follow-up. A lot of kneeling at work - was getting more foot pain  Left - top of foot instep. Worse is with weight on ball of foot  Right foot - outside of foot and burning of heel area  Started 5-6 years ago and just getting worse  A year ago - I felt he had midfoot OAD, mild changes on xray      Objective:     Exam upon stance he has neutral arches. Left foot is mildly tender across the midfoot region and no plantar foot pain. Otherwise exam normal bilateral feet. Upon stance he has a little more pressure in his supratentorial position of the right foot with no widening of the midfoot and forefoot. Imaging: X-rays show no changes. He has very early midfoot arthritis especially appreciated on the lateral view bilateral.        Assessment/Plan:     Diagnoses and all orders for this visit:    Pain in both feet  -     DME - EXTERNAL     Arthritis, midfoot  -     DME - EXTERNAL         Again we discussed the very early midfoot arthritis changes and the need for consistent good shoes and work boots with all of his standing and walking at work as well as updated orthotics. He can try for a little more flexible softer pair as well    New Rx for  orthotics            Adrien Alpers. Shira Moreno DPM  [facility]    Amuso speech recognition software was used to prepare this note. If a word or phrase is confusing, it is likely do to a failure of recognition. Please contact me with any questions or clarifications.

## 2023-06-23 ENCOUNTER — OFFICE VISIT (OUTPATIENT)
Dept: PHYSICAL THERAPY | Age: 63
End: 2023-06-23
Attending: ORTHOPAEDIC SURGERY
Payer: COMMERCIAL

## 2023-06-23 ENCOUNTER — OFFICE VISIT (OUTPATIENT)
Dept: ORTHOPEDICS CLINIC | Facility: CLINIC | Age: 63
End: 2023-06-23
Payer: COMMERCIAL

## 2023-06-23 DIAGNOSIS — Z98.890 S/P ARTHROSCOPY OF SHOULDER: Primary | ICD-10-CM

## 2023-06-23 PROCEDURE — 97110 THERAPEUTIC EXERCISES: CPT

## 2023-06-23 PROCEDURE — 99024 POSTOP FOLLOW-UP VISIT: CPT | Performed by: ORTHOPAEDIC SURGERY

## 2023-06-23 PROCEDURE — 97140 MANUAL THERAPY 1/> REGIONS: CPT

## 2023-06-26 ENCOUNTER — APPOINTMENT (OUTPATIENT)
Dept: PHYSICAL THERAPY | Age: 63
End: 2023-06-26
Attending: ORTHOPAEDIC SURGERY
Payer: COMMERCIAL

## 2023-06-29 ENCOUNTER — OFFICE VISIT (OUTPATIENT)
Dept: PHYSICAL THERAPY | Age: 63
End: 2023-06-29
Attending: ORTHOPAEDIC SURGERY
Payer: COMMERCIAL

## 2023-06-29 PROCEDURE — 97140 MANUAL THERAPY 1/> REGIONS: CPT

## 2023-06-29 PROCEDURE — 97110 THERAPEUTIC EXERCISES: CPT

## 2023-06-30 ENCOUNTER — TELEPHONE (OUTPATIENT)
Dept: FAMILY MEDICINE CLINIC | Facility: CLINIC | Age: 63
End: 2023-06-30

## 2023-07-03 ENCOUNTER — OFFICE VISIT (OUTPATIENT)
Dept: PHYSICAL THERAPY | Age: 63
End: 2023-07-03
Attending: ORTHOPAEDIC SURGERY
Payer: COMMERCIAL

## 2023-07-03 PROCEDURE — 97140 MANUAL THERAPY 1/> REGIONS: CPT

## 2023-07-03 PROCEDURE — 97110 THERAPEUTIC EXERCISES: CPT

## 2023-07-06 ENCOUNTER — OFFICE VISIT (OUTPATIENT)
Dept: PHYSICAL THERAPY | Age: 63
End: 2023-07-06
Attending: ORTHOPAEDIC SURGERY
Payer: COMMERCIAL

## 2023-07-06 PROCEDURE — 97110 THERAPEUTIC EXERCISES: CPT

## 2023-07-06 PROCEDURE — 97140 MANUAL THERAPY 1/> REGIONS: CPT

## 2023-07-06 NOTE — PROGRESS NOTES
Diagnosis:     L RTC repair (supra/infra/subscap), SLAP repair, biceps tenodesis     Referring Provider: Shiv  Date of Evaluation:    4/27/2023    Precautions:   See protocol Next MD visit:   none scheduled  Date of Surgery: 4/25/2023   [facility]     # Auth Visits: [id number]          Subjective: Continues to make gains in strength and ROM, just progressing slow. Pain: 1/10 \"just an ache\"      Objective:     AROM:   Shoulder    Flexion: R 170; L 160  Abduction: R 180; L 140  ER: R 90; L 60  IR: R 60; L 40      PROM:   Shoulder    Flexion: R 170; L 170  Abduction: R 180; L 160  ER: R 90; L 80  IR: R 60; L 50        Assessment: Added prone hor abd to increase scapular strength to aid lifting and reaching. Progressed prone row to match improved strength.      Goals:   (To be met in 30 visits)   Pt will report improved ability to sleep without waking due to shoulder pain  Pt will improve shoulder flexion AROM to >170 degrees to be able to reach into overhead cabinets without pain or restriction  Pt will improve shoulder abduction AROM to >170 degrees to improve ability to don deodorant, don/doff shirts, and wash hair  Pt will increase shoulder AROM ER to 80 to reach and fasten seatbelt   Pt will increase shoulder AROM IR to 60 to be able to reach in back pocket, tuck in shirt, and turn steering wheel without pain  Pt will improve shoulder strength throughout to 5/5 to improve function with ADLs including lifting and carrying objects  Pt will demonstrate increased mid/low trap strength to 5/5 to promote improved shoulder mechanics and stabilization with ADL such as lifting and reaching   Pt will be independent and compliant with comprehensive HEP to maintain progress achieved in PT  Post QuickDASH Outcome Score  Post Score: 13.64 % (6/23/2023  8:26 AM)    18.18 % improvement    Plan: Add tband exercises     Date: 6/15/23  TX#: 15/30 Date: 6/20/23  TX#: 16/30* Date: 6/23/23  TX#: 17/30 Date: 6/29/23  TX#: 18/30 Date: 7/3/23  TX#: 19/30 Date: 7/6/23  TX#: 20/30   THERAPEUTIC EX  Pulleys 5'  PROM elbow/forarm 5'  PROM L shoulder all planes 12'  Wand AAROM flex 2x10  SB flexion rolling on wall AAROM flexion 2x10  Wall push ups on ball 3x10  Reclined active flexion 3x5  Prone row, extension 10x3 2# LUE  SL ER 1# 2x10  SL abd 1# 2x10  SL flex A 2x10  Stand wand flex x10 THERAPEUTIC EX  Pulleys 5'  PROM L shoulder all planes 12'  Wand AAROM flex 2x10  SB flexion rolling on wall AAROM flexion 2x10  Wall push ups on ball 3x10  Reclined active flexion 3x5  Prone row, extension 10x3 2# LUE  SL ER 1# 2x10  SL abd 1# 2x10  SL flex A 2x10  Stand wand flex x10 THERAPEUTIC EX  Pulleys 5'  PROM L shoulder all planes 12'  Wand AAROM flex 2x10  SB flexion rolling on wall AAROM flexion 2x10  Wall push ups on ball 3x10  Reclined active flexion 3x5  Prone row, extension 10x3 2# LUE  SL ER 1# 2x10  SL abd 1# 2x10  SL flex 1# 2x10  Stand wand flex x10 THERAPEUTIC EX  Pulleys 5'  PROM L shoulder all planes 12'  Wand AAROM flex 2x10  SB flexion rolling on wall AAROM flexion 2x10  Wall push ups on ball 3x10  Reclined active flexion 3x5  Prone row, extension 10x3 2# LUE  SL ER 1# 3x10  SL abd 1# 3x10  SL flex 1# 3x10  Stand wand flex 2# 2x10 THERAPEUTIC EX  Pulleys 5'  PROM L shoulder all planes 12'  Wand AAROM flex 2x10  SB flexion rolling on wall AAROM flexion 2x10  Wall push ups on ball 3x10  Reclined active flexion 3x5 1#  Prone row, extension 10x3 2# LUE  SL ER 2# 3x10  SL abd 2# 3x10  SL flex 1# 3x10  Stand wand flex 2# 2x10 THERAPEUTIC EX  UBE 5/5  PROM L shoulder all planes 12'  Wand AAROM flex 2x10  SB flexion rolling on wall AAROM flexion 2x10  Wall push ups on ball 3x10  Reclined active flexion 3x5 1#  Prone row 3# 3x10  Prone extension 10x3 2#  Prone hor abd 1# 3x10  SL ER 2# 3x10  SL abd 2# 3x10  SL flex 2# 3x10  Stand wand flex 2# 2x10   MANUAL  GH mobs inf/post glide grade 3 3' ea MANUAL  [address] mobs inf/post glide grade 3 3' ea MANUAL  GH mobs inf/post glide grade 3 3' ea MANUAL  [address] mobs inf/post glide grade 3 3' ea MANUAL  [address] mobs inf/post glide grade 3 3' ea MANUAL  [address] mobs inf/post glide grade 3 3' ea                   HEP: Codman's, table slides, submax isometrics    Charges: therapeutic ex 2    1 manual    Total Timed Treatment: 45 min  Total Treatment Time: 45 min

## 2023-07-10 ENCOUNTER — HOSPITAL ENCOUNTER (OUTPATIENT)
Dept: CT IMAGING | Age: 63
Discharge: HOME | End: 2023-07-10
Attending: NURSE PRACTITIONER
Payer: COMMERCIAL

## 2023-07-10 DIAGNOSIS — R91.1 PULMONARY NODULE: ICD-10-CM

## 2023-07-10 DIAGNOSIS — Z77.011 LEAD EXPOSURE: ICD-10-CM

## 2023-07-10 PROCEDURE — 71250 CT THORAX DX C-: CPT | Performed by: NURSE PRACTITIONER

## 2023-07-11 ENCOUNTER — OFFICE VISIT (OUTPATIENT)
Dept: PHYSICAL THERAPY | Age: 63
End: 2023-07-11
Attending: ORTHOPAEDIC SURGERY
Payer: COMMERCIAL

## 2023-07-11 PROCEDURE — 97140 MANUAL THERAPY 1/> REGIONS: CPT

## 2023-07-11 PROCEDURE — 97110 THERAPEUTIC EXERCISES: CPT

## 2023-07-11 NOTE — PROGRESS NOTES
Diagnosis:     L RTC repair (supra/infra/subscap), SLAP repair, biceps tenodesis     Referring Provider: Shiv  Date of Evaluation:    4/27/2023    Precautions:   See protocol Next MD visit:   none scheduled  Date of Surgery: 4/25/2023   [facility]     # Auth Visits: 28          Subjective: Noticing improved mobility and strength with ADL activity such as showering. Pain: 1/10 \"just an ache\"      Objective:     AROM:   Shoulder    Flexion: R 170; L 160  Abduction: R 180; L 140  ER: R 90; L 60  IR: R 60; L 50      PROM:   Shoulder    Flexion: R 170; L 170  Abduction: R 180; L 160  ER: R 90; L 80  IR: R 60; L 50        Assessment: Progressed resistance with PREs to match improved strength. Added tband row to increase scapular strength.       Goals:   (To be met in 30 visits)   Pt will report improved ability to sleep without waking due to shoulder pain  Pt will improve shoulder flexion AROM to >170 degrees to be able to reach into overhead cabinets without pain or restriction  Pt will improve shoulder abduction AROM to >170 degrees to improve ability to don deodorant, don/doff shirts, and wash hair  Pt will increase shoulder AROM ER to 80 to reach and fasten seatbelt   Pt will increase shoulder AROM IR to 60 to be able to reach in back pocket, tuck in shirt, and turn steering wheel without pain  Pt will improve shoulder strength throughout to 5/5 to improve function with ADLs including lifting and carrying objects  Pt will demonstrate increased mid/low trap strength to 5/5 to promote improved shoulder mechanics and stabilization with ADL such as lifting and reaching   Pt will be independent and compliant with comprehensive HEP to maintain progress achieved in PT  Post QuickDASH Outcome Score  Post Score: 13.64 % (6/23/2023  8:26 AM)    18.18 % improvement    Plan: Add tband ER    Date: 6/20/23  TX#: 16/30* Date: 6/23/23  TX#: 17/30 Date: 6/29/23  TX#: 18/30 Date: 7/3/23  TX#: 19/30 Date: 7/6/23  TX#: 20/30 Date: 7/11/23  TX#: 21/30   THERAPEUTIC EX  Pulleys 5'  PROM L shoulder all planes 12'  Wand AAROM flex 2x10  SB flexion rolling on wall AAROM flexion 2x10  Wall push ups on ball 3x10  Reclined active flexion 3x5  Prone row, extension 10x3 2# LUE  SL ER 1# 2x10  SL abd 1# 2x10  SL flex A 2x10  Stand wand flex x10 THERAPEUTIC EX  Pulleys 5'  PROM L shoulder all planes 12'  Wand AAROM flex 2x10  SB flexion rolling on wall AAROM flexion 2x10  Wall push ups on ball 3x10  Reclined active flexion 3x5  Prone row, extension 10x3 2# LUE  SL ER 1# 2x10  SL abd 1# 2x10  SL flex 1# 2x10  Stand wand flex x10 THERAPEUTIC EX  Pulleys 5'  PROM L shoulder all planes 12'  Wand AAROM flex 2x10  SB flexion rolling on wall AAROM flexion 2x10  Wall push ups on ball 3x10  Reclined active flexion 3x5  Prone row, extension 10x3 2# LUE  SL ER 1# 3x10  SL abd 1# 3x10  SL flex 1# 3x10  Stand wand flex 2# 2x10 THERAPEUTIC EX  Pulleys 5'  PROM L shoulder all planes 12'  Wand AAROM flex 2x10  SB flexion rolling on wall AAROM flexion 2x10  Wall push ups on ball 3x10  Reclined active flexion 3x5 1#  Prone row, extension 10x3 2# LUE  SL ER 2# 3x10  SL abd 2# 3x10  SL flex 1# 3x10  Stand wand flex 2# 2x10 THERAPEUTIC EX  UBE 5/5  PROM L shoulder all planes 12'  Wand AAROM flex 2x10  SB flexion rolling on wall AAROM flexion 2x10  Wall push ups on ball 3x10  Reclined active flexion 3x5 1#  Prone row 3# 3x10  Prone extension 10x3 2#  Prone hor abd 1# 3x10  SL ER 2# 3x10  SL abd 2# 3x10  SL flex 2# 3x10  Stand wand flex 2# 2x10 THERAPEUTIC EX  UBE 5/5  PROM L shoulder all planes 12'  SB flexion rolling on wall AAROM flexion 2x10  Wall push ups on ball 3x10  Prone row 3# 3x10  Prone extension 10x3 3#  Prone hor abd 1# 3x10  SL ER 3# 3x10  SL abd 3# 3x10  SL flex 3# 3x10  Tband row grn 3x10  Standing flexion 2# 3x10   MANUAL  [facility] mobs inf/post glide grade 3 3' ea MANUAL  [address] mobs inf/post glide grade 3 3' ea MANUAL  [address] [facility] mobs inf/post glide grade 3 3' ea MANUAL  [address] mobs inf/post glide grade 3 3' ea MANUAL  [address] mobs inf/post glide grade 3 3' ea MANUAL  [address] mobs inf/post glide grade 3 3' ea                   HEP: Codman's, table slides, submax isometrics    Charges: therapeutic ex 2    1 manual    Total Timed Treatment: 45 min  Total Treatment Time: 45 min

## 2023-07-18 ENCOUNTER — OFFICE VISIT (OUTPATIENT)
Dept: PHYSICAL THERAPY | Age: 63
End: 2023-07-18
Attending: ORTHOPAEDIC SURGERY
Payer: COMMERCIAL

## 2023-07-18 PROCEDURE — 97110 THERAPEUTIC EXERCISES: CPT

## 2023-07-18 NOTE — PROGRESS NOTES
Diagnosis:     L RTC repair (supra/infra/subscap), SLAP repair, biceps tenodesis     Referring Provider: Shiv  Date of Evaluation:    4/27/2023    Precautions:   See protocol Next MD visit:   none scheduled  Date of Surgery: 4/25/2023   Insurance Primary/Secondary: [employer] / N/A     # Auth Visits: 28          Subjective: Feeling much better overall. 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 Pain: 1/10 \"just an ache\"      Objective:     AROM:   Shoulder    Flexion: R 170; L 160  Abduction: R 180; L 140  ER: R 90; L 60  IR: R 60; L 50      PROM:   Shoulder    Flexion: R 170; L 170  Abduction: R 180; L 160  ER: R 90; L 80  IR: R 60; L 50        Assessment: Added OH press to increase OH lifting ability. Continued gains in functional use of the L UE.      Goals:   (To be met in 30 visits)   Pt will report improved ability to sleep without waking due to shoulder pain  Pt will improve shoulder flexion AROM to >170 degrees to be able to reach into overhead cabinets without pain or restriction  Pt will improve shoulder abduction AROM to >170 degrees to improve ability to don deodorant, don/doff shirts, and wash hair  Pt will increase shoulder AROM ER to 80 to reach and fasten seatbelt   Pt will increase shoulder AROM IR to 60 to be able to reach in back pocket, tuck in shirt, and turn steering wheel without pain  Pt will improve shoulder strength throughout to 5/5 to improve function with ADLs including lifting and carrying objects  Pt will demonstrate increased mid/low trap strength to 5/5 to promote improved shoulder mechanics and stabilization with ADL such as lifting and reaching   Pt will be independent and compliant with comprehensive HEP to maintain progress achieved in PT  Post QuickDASH Outcome Score  Post Score: 13.64 % (6/23/2023  8:26 AM)    18.18 % improvement    Plan: Add tband ER    Date: 6/20/23  TX#: 16/30* Date: 6/29/23  TX#: 18/30 Date: 7/3/23  TX#: 19/30 Date: 7/6/23  TX#: 20/30 Date: 7/11/23  TX#: 21/30 Date: 7/18/23  TX#: 22/30   THERAPEUTIC EX  Pulleys 5'  PROM L shoulder all planes 12'  Wand AAROM flex 2x10  SB flexion rolling on wall AAROM flexion 2x10  Wall push ups on ball 3x10  Reclined active flexion 3x5  Prone row, extension 10x3 2# LUE  SL ER 1# 2x10  SL abd 1# 2x10  SL flex A 2x10  Stand wand flex x10 THERAPEUTIC EX  Pulleys 5'  PROM L shoulder all planes 12'  Wand AAROM flex 2x10  SB flexion rolling on wall AAROM flexion 2x10  Wall push ups on ball 3x10  Reclined active flexion 3x5  Prone row, extension 10x3 2# LUE  SL ER 1# 3x10  SL abd 1# 3x10  SL flex 1# 3x10  Stand wand flex 2# 2x10 THERAPEUTIC EX  Pulleys 5'  PROM L shoulder all planes 12'  Wand AAROM flex 2x10  SB flexion rolling on wall AAROM flexion 2x10  Wall push ups on ball 3x10  Reclined active flexion 3x5 1#  Prone row, extension 10x3 2# LUE  SL ER 2# 3x10  SL abd 2# 3x10  SL flex 1# 3x10  Stand wand flex 2# 2x10 THERAPEUTIC EX  UBE 5/5  PROM L shoulder all planes 12'  Wand AAROM flex 2x10  SB flexion rolling on wall AAROM flexion 2x10  Wall push ups on ball 3x10  Reclined active flexion 3x5 1#  Prone row 3# 3x10  Prone extension 10x3 2#  Prone hor abd 1# 3x10  SL ER 2# 3x10  SL abd 2# 3x10  SL flex 2# 3x10  Stand wand flex 2# 2x10 THERAPEUTIC EX  UBE 5/5  PROM L shoulder all planes 12'  SB flexion rolling on wall AAROM flexion 2x10  Wall push ups on ball 3x10  Prone row 3# 3x10  Prone extension 10x3 3#  Prone hor abd 1# 3x10  SL ER 3# 3x10  SL abd 3# 3x10  SL flex 3# 3x10  Tband row grn 3x10  Standing flexion 2# 3x10 UBE 5/5  PROM L shoulder all planes 12'  SB flexion rolling on wall AAROM flexion 2x10  Wall push ups on ball 3x10  Prone row 4# 3x10  Prone extension 10x3 3#  Prone hor abd 2# 3x10  SL ER 3# 3x10  SL abd 3# 3x10  SL flex 3# 3x10  Tabd row 3x10 grn  Tband ext 3x10 grn  Standing flex 2# 3x10  Standing OH press 2#  3x10   MANUAL  [facility] mobs inf/post glide grade 3 3' ea MANUAL  [facility] mobs inf/post glide grade 3 3' ea MANUAL  [address] mobs inf/post glide grade 3 3' ea MANUAL  [address] mobs inf/post glide grade 3 3' ea MANUAL  [address] mobs inf/post glide grade 3 3' ea                    HEP: Codman's, table slides, submax isometrics    Charges: therapeutic ex 3      Total Timed Treatment: 45 min  Total Treatment Time: 53 min

## 2023-07-20 ENCOUNTER — OFFICE VISIT (OUTPATIENT)
Dept: PHYSICAL THERAPY | Age: 63
End: 2023-07-20
Attending: ORTHOPAEDIC SURGERY
Payer: COMMERCIAL

## 2023-07-20 PROCEDURE — 97110 THERAPEUTIC EXERCISES: CPT

## 2023-07-24 ENCOUNTER — OFFICE VISIT (OUTPATIENT)
Dept: PHYSICAL THERAPY | Age: 63
End: 2023-07-24
Attending: ORTHOPAEDIC SURGERY
Payer: COMMERCIAL

## 2023-07-24 PROCEDURE — 97110 THERAPEUTIC EXERCISES: CPT

## 2023-07-24 NOTE — PROGRESS NOTES
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 Pain: 1/10 \"just an ache\"      Objective:     AROM:   Shoulder    Flexion: R 170; L 160  Abduction: R 180; L 140  ER: R 90; L 60  IR: R 60; L 50      PROM:   Shoulder    Flexion: R 170; L 170  Abduction: R 180; L 160  ER: R 90; L 80  IR: R 60; L 50        Assessment: Added tband ER to increase shoulder strength and stability. Goals:   (To be met in 30 visits)   Pt will report improved ability to sleep without waking due to shoulder pain  Pt will improve shoulder flexion AROM to >170 degrees to be able to reach into overhead cabinets without pain or restriction  Pt will improve shoulder abduction AROM to >170 degrees to improve ability to don deodorant, don/doff shirts, and wash hair  Pt will increase shoulder AROM ER to 80 to reach and fasten seatbelt   Pt will increase shoulder AROM IR to 60 to be able to reach in back pocket, tuck in shirt, and turn steering wheel without pain  Pt will improve shoulder strength throughout to 5/5 to improve function with ADLs including lifting and carrying objects  Pt will demonstrate increased mid/low trap strength to 5/5 to promote improved shoulder mechanics and stabilization with ADL such as lifting and reaching   Pt will be independent and compliant with comprehensive HEP to maintain progress achieved in PT  Post QuickDASH Outcome Score  Post Score: 13.64 % (6/23/2023  8:26 AM)    18.18 % improvement    Plan:  Add  body blade    Date: 6/20/23  TX#: 16/30* Date: 7/6/23  TX#: 20/30 Date: 7/11/23  TX#: 21/30 Date: 7/18/23  TX#: 22/30 Date: 7/20/23  TX#: 23/30 Date: 7/24/23  TX#: 24/30   THERAPEUTIC EX  Pulleys 5'  PROM L shoulder all planes 12'  Wand AAROM flex 2x10  SB flexion rolling on wall AAROM flexion 2x10  Wall push ups on ball 3x10  Reclined active flexion 3x5  Prone row, extension 10x3 2# LUE  SL ER 1# 2x10  SL abd 1# 2x10  SL flex A 2x10  Stand wand flex x10 THERAPEUTIC EX  UBE 5/5  PROM L shoulder all planes 12'  Wand AAROM flex 2x10  SB flexion rolling on wall AAROM flexion 2x10  Wall push ups on ball 3x10  Reclined active flexion 3x5 1#  Prone row 3# 3x10  Prone extension 10x3 2#  Prone hor abd 1# 3x10  SL ER 2# 3x10  SL abd 2# 3x10  SL flex 2# 3x10  Stand wand flex 2# 2x10 THERAPEUTIC EX  UBE 5/5  PROM L shoulder all planes 12'  SB flexion rolling on wall AAROM flexion 2x10  Wall push ups on ball 3x10  Prone row 3# 3x10  Prone extension 10x3 3#  Prone hor abd 1# 3x10  SL ER 3# 3x10  SL abd 3# 3x10  SL flex 3# 3x10  Tband row grn 3x10  Standing flexion 2# 3x10 UBE 5/5  PROM L shoulder all planes 12'  SB flexion rolling on wall AAROM flexion 2x10  Wall push ups on ball 3x10  Prone row 4# 3x10  Prone extension 10x3 3#  Prone hor abd 2# 3x10  SL ER 3# 3x10  SL abd 3# 3x10  SL flex 3# 3x10  Tabd row 3x10 grn  Tband ext 3x10 grn  Standing flex 2# 3x10  Standing OH press 2#  3x10 THERAPEUTIC EX  UBE 5/5  PROM L shoulder all planes 12'  SB flexion rolling on wall AAROM flexion 2x10  Wall push ups on ball 3x10  Prone row 5# 3x10  Prone extension 10x3 3#  Prone hor abd 2# 3x10  SL ER 3# 3x10  SL abd 3# 3x10  SL flex 3# 3x10  Tabd row 3x10 grn  Tband ext 3x10 grn  Standing flex 2# 3x10  Standing OH press 2#  3x10 THERAPEUTIC EX  UBE 5/5  PROM L shoulder all planes 12'  SB flexion rolling on wall AAROM flexion 2x10  Wall push ups on ball 3x10  Prone row 5# 3x10  Prone extension 10x3 3#  Prone hor abd 2# 3x10  SL ER 3# 3x10  SL abd 3# 3x10  SL flex 3# 3x10  Tband row 3x10 grn  Tband ext 3x10 grn  Standing flex 2# 3x10  Standing OH press 2#  3x10   MANUAL  [address] [facility] mobs inf/post glide grade 3 3' ea MANUAL  [address] mobs inf/post glide grade 3 3' ea MANUAL  [address] [facility] [place] [facility]/post glide grade 3 3' ea                      HEP: Codman's, table slides, submax isometrics    Charges: therapeutic ex 3      Total Timed Treatment: 45 min  Total Treatment Time: 53 min

## 2023-07-25 ENCOUNTER — HOSPITAL ENCOUNTER (OUTPATIENT)
Dept: ULTRASOUND IMAGING | Age: 63
Discharge: HOME | End: 2023-07-25
Attending: NURSE PRACTITIONER
Payer: COMMERCIAL

## 2023-07-25 DIAGNOSIS — E78.2 MIXED HYPERLIPIDEMIA: ICD-10-CM

## 2023-07-25 DIAGNOSIS — Z13.6 ENCOUNTER FOR SCREENING FOR STENOSIS OF CAROTID ARTERY: ICD-10-CM

## 2023-07-25 PROCEDURE — 93880 EXTRACRANIAL BILAT STUDY: CPT | Performed by: NURSE PRACTITIONER

## 2023-07-26 ENCOUNTER — TELEPHONE (OUTPATIENT)
Dept: FAMILY MEDICINE CLINIC | Facility: CLINIC | Age: 63
End: 2023-07-26

## 2023-07-26 DIAGNOSIS — R10.32 LLQ PAIN: ICD-10-CM

## 2023-07-26 DIAGNOSIS — Z98.890 S/P LEFT INGUINAL HERNIA REPAIR: Primary | ICD-10-CM

## 2023-07-26 DIAGNOSIS — E78.2 MIXED HYPERLIPIDEMIA: Primary | ICD-10-CM

## 2023-07-26 DIAGNOSIS — Z87.19 S/P LEFT INGUINAL HERNIA REPAIR: Primary | ICD-10-CM

## 2023-07-26 NOTE — TELEPHONE ENCOUNTER
I would recommend calling Dr. Yousuf Norman office to discuss. Placed new referral if needed  If in acute pain, could also be evaluated in IC today.

## 2023-07-26 NOTE — TELEPHONE ENCOUNTER
Spoke with patient regarding test results. He states that he was drying himself off after showering and felt a \"pop\" near the area that he had hernia repair last year (Dr. Garcia Martinez). Patient states that when he lifts his leg he does have pain in the area. Patient is asking if he should schedule an appointment with Dr. Garcia Martinez or in office for evaluation?

## 2023-07-27 ENCOUNTER — OFFICE VISIT (OUTPATIENT)
Dept: PHYSICAL THERAPY | Age: 63
End: 2023-07-27
Attending: ORTHOPAEDIC SURGERY
Payer: COMMERCIAL

## 2023-07-27 PROCEDURE — 97110 THERAPEUTIC EXERCISES: CPT

## 2023-07-27 NOTE — PROGRESS NOTES
Diagnosis:     L RTC repair (supra/infra/subscap), SLAP repair, biceps tenodesis     Referring Provider: Shiv  Date of Evaluation:    4/27/2023    Precautions:   See protocol Next MD visit:   none scheduled  Date of Surgery: 4/25/2023   [facility] [employer] [facility]     # Auth Visits: 28          Subjective: Strength continues to improve, thinking I might start doing more work. Pain: 1/10 \"just an ache\"      Objective:     AROM:   Shoulder    Flexion: R 170; L 160  Abduction: R 180; L 140  ER: R 90; L 60  IR: R 60; L 50      PROM:   Shoulder    Flexion: R 170; L 170  Abduction: R 180; L 160  ER: R 90; L 80  IR: R 60; L 50        Assessment: Added three position body blade to increase UE endurance and scapular stability.      Goals:   (To be met in 30 visits)   Pt will report improved ability to sleep without waking due to shoulder pain  Pt will improve shoulder flexion AROM to >170 degrees to be able to reach into overhead cabinets without pain or restriction  Pt will improve shoulder abduction AROM to >170 degrees to improve ability to don deodorant, don/doff shirts, and wash hair  Pt will increase shoulder AROM ER to 80 to reach and fasten seatbelt   Pt will increase shoulder AROM IR to 60 to be able to reach in back pocket, tuck in shirt, and turn steering wheel without pain  Pt will improve shoulder strength throughout to 5/5 to improve function with ADLs including lifting and carrying objects  Pt will demonstrate increased mid/low trap strength to 5/5 to promote improved shoulder mechanics and stabilization with ADL such as lifting and reaching   Pt will be independent and compliant with comprehensive HEP to maintain progress achieved in PT  Post QuickDASH Outcome Score  Post Score: 13.64 % (6/23/2023  8:26 AM)    18.18 % improvement    Plan: Progress OH activity    Date: 6/20/23  TX#: 16/30* Date: 7/11/23  TX#: 21/30 Date: 7/18/23  TX#: 22/30 Date: 7/20/23  TX#: 23/30 Date: 7/24/23  TX#: 24/30 ate: 7/27/23  TX#: 25/30   THERAPEUTIC EX  Pulleys 5'  PROM L shoulder all planes 12'  Wand AAROM flex 2x10  SB flexion rolling on wall AAROM flexion 2x10  Wall push ups on ball 3x10  Reclined active flexion 3x5  Prone row, extension 10x3 2# LUE  SL ER 1# 2x10  SL abd 1# 2x10  SL flex A 2x10  Stand wand flex x10 THERAPEUTIC EX  UBE 5/5  PROM L shoulder all planes 12'  SB flexion rolling on wall AAROM flexion 2x10  Wall push ups on ball 3x10  Prone row 3# 3x10  Prone extension 10x3 3#  Prone hor abd 1# 3x10  SL ER 3# 3x10  SL abd 3# 3x10  SL flex 3# 3x10  Tband row grn 3x10  Standing flexion 2# 3x10 UBE 5/5  PROM L shoulder all planes 12'  SB flexion rolling on wall AAROM flexion 2x10  Wall push ups on ball 3x10  Prone row 4# 3x10  Prone extension 10x3 3#  Prone hor abd 2# 3x10  SL ER 3# 3x10  SL abd 3# 3x10  SL flex 3# 3x10  Tabd row 3x10 grn  Tband ext 3x10 grn  Standing flex 2# 3x10  Standing OH press 2#  3x10 THERAPEUTIC EX  UBE 5/5  PROM L shoulder all planes 12'  SB flexion rolling on wall AAROM flexion 2x10  Wall push ups on ball 3x10  Prone row 5# 3x10  Prone extension 10x3 3#  Prone hor abd 2# 3x10  SL ER 3# 3x10  SL abd 3# 3x10  SL flex 3# 3x10  Tabd row 3x10 grn  Tband ext 3x10 grn  Standing flex 2# 3x10  Standing OH press 2#  3x10 THERAPEUTIC EX  UBE 5/5  PROM L shoulder all planes 12'  SB flexion rolling on wall AAROM flexion 2x10  Wall push ups on ball 3x10  Prone row 5# 3x10  Prone extension 10x3 3#  Prone hor abd 2# 3x10  SL ER 3# 3x10  SL abd 3# 3x10  SL flex 3# 3x10  Tband row 3x10 grn  Tband ext 3x10 grn  Standing flex 2# 3x10  Standing OH press 2#  3x10 THERAPEUTIC EX  UBE 5/5  PROM L shoulder all planes 12'  SB flexion rolling on wall AAROM flexion 2x10  Wall push ups on ball 3x10  Prone row 5# 3x10  Prone extension 10x3 3#  Prone hor abd 2# 3x10  SL ER 3# 3x10  Tband row 3x10 grn  Tband ext 3x10 grn  Standing flex 2# 3x10  Standing OH press 2#  3x10  3 pos body blade (neutral,flex,abd) 2x30\" ea   MANUAL  [address] mobs inf/post glide grade 3 3' ea MANUAL  [address] [facility] inf/post glide grade 3 3' ea                       HEP: Codman's, table slides, submax isometrics    Charges: therapeutic ex 3      Total Timed Treatment: 45 min  Total Treatment Time: 53 min

## 2023-08-02 ENCOUNTER — OFFICE VISIT (OUTPATIENT)
Dept: PHYSICAL THERAPY | Age: 63
End: 2023-08-02
Attending: ORTHOPAEDIC SURGERY
Payer: COMMERCIAL

## 2023-08-02 PROCEDURE — 97110 THERAPEUTIC EXERCISES: CPT

## 2023-08-08 ENCOUNTER — OFFICE VISIT (OUTPATIENT)
Facility: LOCATION | Age: 63
End: 2023-08-08
Payer: COMMERCIAL

## 2023-08-08 ENCOUNTER — OFFICE VISIT (OUTPATIENT)
Dept: PHYSICAL THERAPY | Age: 63
End: 2023-08-08
Attending: ORTHOPAEDIC SURGERY
Payer: COMMERCIAL

## 2023-08-08 VITALS — TEMPERATURE: 98 F | HEART RATE: 70 BPM

## 2023-08-08 DIAGNOSIS — N50.89 ENLARGED TESTICLE: Primary | ICD-10-CM

## 2023-08-08 PROCEDURE — 99214 OFFICE O/P EST MOD 30 MIN: CPT | Performed by: SURGERY

## 2023-08-08 PROCEDURE — 97110 THERAPEUTIC EXERCISES: CPT

## 2023-08-08 NOTE — PROGRESS NOTES
Progress Summary  Pt has attended 27 visits in Physical Therapy. Diagnosis:     L RTC repair (supra/infra/subscap), SLAP repair, biceps tenodesis     Referring Provider: Shiv  Date of Evaluation:    4/27/2023    Precautions:   See protocol Next MD visit:   none scheduled  Date of Surgery: 4/25/2023   Ins[facility] Primary/Secondary[facility] [email or web address] [facility]     # Auth Visits: 36         Subjective: Feel a lot better, easing back into work and sleeping without any difficulty. Pain: 1/10 \"just an ache\"      Objective:     AROM:   Shoulder    Flexion: R 170; L 160  Abduction: R 180; L 140  ER: R 90; L 80  IR: R 60; L 50      PROM:   Shoulder    Flexion: R 170; L 170  Abduction: R 180; L 160  ER: R 90; L 90  IR: R 60; L 50        Assessment: Good ROM and much improved strength. Patient gradually returning to work activity.      Goals:   (To be met in 30 visits)   Pt will report improved ability to sleep without waking due to shoulder pain  Pt will improve shoulder flexion AROM to >170 degrees to be able to reach into overhead cabinets without pain or restriction  Pt will improve shoulder abduction AROM to >170 degrees to improve ability to don deodorant, don/doff shirts, and wash hair  Pt will increase shoulder AROM ER to 80 to reach and fasten seatbelt   Pt will increase shoulder AROM IR to 60 to be able to reach in back pocket, tuck in shirt, and turn steering wheel without pain  Pt will improve shoulder strength throughout to 5/5 to improve function with ADLs including lifting and carrying objects  Pt will demonstrate increased mid/low trap strength to 5/5 to promote improved shoulder mechanics and stabilization with ADL such as lifting and reaching   Pt will be independent and compliant with comprehensive HEP to maintain progress achieved in PT  Post QuickDASH Outcome Score  Post Score: 4.55 % (8/8/2023  9:01 AM)    27.27 % improvement    Plan: Continue skilled Physical Therapy 2 x/week or a total of 8 visits over a 90 day period. Treatment will include: therapeutic ex, manual therapy       Patient/Family/Caregiver was advised of these findings, precautions, and treatment options and has agreed to actively participate in planning and for this course of care. Thank you for your referral. If you have any questions, please contact me at Dept: [phone]. Sincerely,  Electronically signed by therapist: Koko Kc PT     Physician's certification required:  Yes  Please co-sign or sign and return this letter via fax as soon as possible to [phone]. I certify the need for these services furnished under this plan of treatment and while under my care.     X___________________________________________________ Date____________________    Certification From: 5/1/2609  To:11/6/2023      Date: 7/18/23  TX#: 22/30 Date: 7/20/23  TX#: 23/30 Date: 7/24/23  TX#: 24/30 Date: 7/27/23  TX#: 25/30 Date: 8/2/23  TX#: 26/30 Date: 8/8/23  TX#: 27/30   UBE 5/5  PROM L shoulder all planes 12'  SB flexion rolling on wall AAROM flexion 2x10  Wall push ups on ball 3x10  Prone row 4# 3x10  Prone extension 10x3 3#  Prone hor abd 2# 3x10  SL ER 3# 3x10  SL abd 3# 3x10  SL flex 3# 3x10  Tabd row 3x10 grn  Tband ext 3x10 grn  Standing flex 2# 3x10  Standing OH press 2#  3x10 THERAPEUTIC EX  UBE 5/5  PROM L shoulder all planes 12'  SB flexion rolling on wall AAROM flexion 2x10  Wall push ups on ball 3x10  Prone row 5# 3x10  Prone extension 10x3 3#  Prone hor abd 2# 3x10  SL ER 3# 3x10  SL abd 3# 3x10  SL flex 3# 3x10  Tabd row 3x10 grn  Tband ext 3x10 grn  Standing flex 2# 3x10  Standing OH press 2#  3x10 THERAPEUTIC EX  UBE 5/5  PROM L shoulder all planes 12'  SB flexion rolling on wall AAROM flexion 2x10  Wall push ups on ball 3x10  Prone row 5# 3x10  Prone extension 10x3 3#  Prone hor abd 2# 3x10  SL ER 3# 3x10  SL abd 3# 3x10  SL flex 3# 3x10  Tband row 3x10 grn  Tband ext 3x10 grn  Standing flex 2# 3x10  Standing OH press 2#  3x10 THERAPEUTIC EX  UBE 5/5  PROM L shoulder all planes 12'  SB flexion rolling on wall AAROM flexion 2x10  Wall push ups on ball 3x10  Prone row 5# 3x10  Prone extension 10x3 3#  Prone hor abd 2# 3x10  SL ER 3# 3x10  Tband row 3x10 grn  Tband ext 3x10 grn  Standing flex 2# 3x10  Standing OH press 2#  3x10  3 pos body blade (neutral,flex,abd) 2x30\" ea THERAPEUTIC EX  UBE 5/5  PROM L shoulder all planes 12'  SB flexion rolling on wall AAROM flexion 2x10  Wall push ups on ball 3x10  Prone row 5# 3x10  Prone extension 10x3 4#  Prone hor abd 3# 3x10  SL ER 3# 3x10  Tband row 3x10 grn  Tband ext 3x10 grn  Standing flex 2# 3x10  Standing OH press 2#  3x10  3 pos body blade (neutral,flex,abd) 2x30\" ea THERAPEUTIC EX  UBE 5/5  PROM L shoulder all planes 12'  SL ER 3# 3x10  SL abd 3# 3x10  SL flex 3# 3x10  Prone hor abd 3# 3x10  FM pull down 13# 3x10  FM row 17# 3x10  3 pos body blade (90 flex, 90 abd, OH) 2x30\" ea  Wall walks with band 8'x6 yellow                           HEP: Codman's, table slides, submax isometrics    Charges: therapeutic ex 3      Total Timed Treatment: 45 min  Total Treatment Time: 53 min none

## 2023-08-10 ENCOUNTER — OFFICE VISIT (OUTPATIENT)
Dept: PHYSICAL THERAPY | Age: 63
End: 2023-08-10
Attending: ORTHOPAEDIC SURGERY
Payer: COMMERCIAL

## 2023-08-10 PROCEDURE — 97110 THERAPEUTIC EXERCISES: CPT

## 2023-08-10 NOTE — PROGRESS NOTES
Diagnosis:     L RTC repair (supra/infra/subscap), SLAP repair, biceps tenodesis     Referring Provider: Shiv  Date of Evaluation:    4/27/2023    Precautions:   See protocol Next MD visit:   none scheduled  Date of Surgery: 4/25/2023   [facility] Primary/Secondary: [employer] [facility]     # Auth Visits: 36         Subjective: Continue to ease into work activity. Pain: 1/10 \"just an ache\"      Objective:     AROM:   Shoulder    Flexion: R 170; L 160  Abduction: R 180; L 140  ER: R 90; L 80  IR: R 60; L 50      PROM:   Shoulder    Flexion: R 170; L 170  Abduction: R 180; L 160  ER: R 90; L 90  IR: R 60; L 50        Assessment: Progressed PREs to match improved strength.      Goals:   (To be met in 30 visits)   Pt will report improved ability to sleep without waking due to shoulder pain  Pt will improve shoulder flexion AROM to >170 degrees to be able to reach into overhead cabinets without pain or restriction  Pt will improve shoulder abduction AROM to >170 degrees to improve ability to don deodorant, don/doff shirts, and wash hair  Pt will increase shoulder AROM ER to 80 to reach and fasten seatbelt   Pt will increase shoulder AROM IR to 60 to be able to reach in back pocket, tuck in shirt, and turn steering wheel without pain  Pt will improve shoulder strength throughout to 5/5 to improve function with ADLs including lifting and carrying objects  Pt will demonstrate increased mid/low trap strength to 5/5 to promote improved shoulder mechanics and stabilization with ADL such as lifting and reaching   Pt will be independent and compliant with comprehensive HEP to maintain progress achieved in PT  Post QuickDASH Outcome Score  Post Score: 4.55 % (8/8/2023  9:01 AM)    27.27 % improvement    Plan: Progress OH strength    Date: 7/20/23  TX#: 23/30 Date: 7/24/23  TX#: 24/30 Date: 7/27/23  TX#: 25/30 Date: 8/2/23  TX#: 26/30 Date: 8/8/23  TX#: 27/30 Date: 8/10/23  TX#: 28/30   THERAPEUTIC EX  UBE 5/5  PROM L shoulder all planes 12'  SB flexion rolling on wall AAROM flexion 2x10  Wall push ups on ball 3x10  Prone row 5# 3x10  Prone extension 10x3 3#  Prone hor abd 2# 3x10  SL ER 3# 3x10  SL abd 3# 3x10  SL flex 3# 3x10  Tabd row 3x10 grn  Tband ext 3x10 grn  Standing flex 2# 3x10  Standing OH press 2#  3x10 THERAPEUTIC EX  UBE 5/5  PROM L shoulder all planes 12'  SB flexion rolling on wall AAROM flexion 2x10  Wall push ups on ball 3x10  Prone row 5# 3x10  Prone extension 10x3 3#  Prone hor abd 2# 3x10  SL ER 3# 3x10  SL abd 3# 3x10  SL flex 3# 3x10  Tband row 3x10 grn  Tband ext 3x10 grn  Standing flex 2# 3x10  Standing OH press 2#  3x10 THERAPEUTIC EX  UBE 5/5  PROM L shoulder all planes 12'  SB flexion rolling on wall AAROM flexion 2x10  Wall push ups on ball 3x10  Prone row 5# 3x10  Prone extension 10x3 3#  Prone hor abd 2# 3x10  SL ER 3# 3x10  Tband row 3x10 grn  Tband ext 3x10 grn  Standing flex 2# 3x10  Standing OH press 2#  3x10  3 pos body blade (neutral,flex,abd) 2x30\" ea THERAPEUTIC EX  UBE 5/5  PROM L shoulder all planes 12'  SB flexion rolling on wall AAROM flexion 2x10  Wall push ups on ball 3x10  Prone row 5# 3x10  Prone extension 10x3 4#  Prone hor abd 3# 3x10  SL ER 3# 3x10  Tband row 3x10 grn  Tband ext 3x10 grn  Standing flex 2# 3x10  Standing OH press 2#  3x10  3 pos body blade (neutral,flex,abd) 2x30\" ea THERAPEUTIC EX  UBE 5/5  PROM L shoulder all planes 12'  SL ER 3# 3x10  SL abd 3# 3x10  SL flex 3# 3x10  Prone hor abd 3# 3x10  FM pull down 13# 3x10  FM row 17# 3x10  3 pos body blade (90 flex, 90 abd, OH) 2x30\" ea  Wall walks with band 8'x6 yellow THERAPEUTIC EX  UBE 5/5  PROM L shoulder all planes 12'  SL ER 4# 3x10  SL abd 4# 3x10  SL flex 4# 3x10  Prone hor abd 4# 3x10  FM pull down 13# 3x10  FM row 17# 3x10  3 pos body blade (90 flex, 90 abd, OH) 2x30\" ea  Wall walks with band 8'x6 yellow  Standing flex 3# 3x10  Standing OH press 3#  3x10                           HEP: Codman's, table slides, submax isometrics    Charges: therapeutic ex 3      Total Timed Treatment: 45 min  Total Treatment Time: 53 min

## 2023-08-16 ENCOUNTER — APPOINTMENT (OUTPATIENT)
Dept: PHYSICAL THERAPY | Age: 63
End: 2023-08-16
Attending: ORTHOPAEDIC SURGERY
Payer: COMMERCIAL

## 2023-08-24 ENCOUNTER — OFFICE VISIT (OUTPATIENT)
Dept: PHYSICAL THERAPY | Age: 63
End: 2023-08-24
Attending: ORTHOPAEDIC SURGERY
Payer: COMMERCIAL

## 2023-08-24 PROCEDURE — 97110 THERAPEUTIC EXERCISES: CPT

## 2023-08-24 NOTE — PROGRESS NOTES
Diagnosis:     L RTC repair (supra/infra/subscap), SLAP repair, biceps tenodesis     Referring Provider: Shiv  Date of Evaluation:    4/27/2023    Precautions:   See protocol Next MD visit:   none scheduled  Date of Surgery: 4/25/2023   [facility]     # Auth Visits: 36         Subjective: Working more and more with less discomfort. Pain: 1/10 \"just an ache\"      Objective:     AROM:   Shoulder    Flexion: R 170; L 170  Abduction: R 180; L 150  ER: R 90; L 80  IR: R 60; L 50      PROM:   Shoulder    Flexion: R 170; L 170  Abduction: R 180; L 160  ER: R 90; L 90  IR: R 60; L 50        Assessment: Continued gains in ROM and strength.      Goals:   (To be met in 30 visits)   Pt will report improved ability to sleep without waking due to shoulder pain  Pt will improve shoulder flexion AROM to >170 degrees to be able to reach into overhead cabinets without pain or restriction  Pt will improve shoulder abduction AROM to >170 degrees to improve ability to don deodorant, don/doff shirts, and wash hair  Pt will increase shoulder AROM ER to 80 to reach and fasten seatbelt   Pt will increase shoulder AROM IR to 60 to be able to reach in back pocket, tuck in shirt, and turn steering wheel without pain  Pt will improve shoulder strength throughout to 5/5 to improve function with ADLs including lifting and carrying objects  Pt will demonstrate increased mid/low trap strength to 5/5 to promote improved shoulder mechanics and stabilization with ADL such as lifting and reaching   Pt will be independent and compliant with comprehensive HEP to maintain progress achieved in PT  Post QuickDASH Outcome Score  Post Score: 4.55 % (8/8/2023  9:01 AM)    27.27 % improvement    Plan: Reassess    Date: 7/24/23  TX#: 24/30 Date: 7/27/23  TX#: 25/30 Date: 8/2/23  TX#: 26/30 Date: 8/8/23  TX#: 27/30 Date: 8/10/23  TX#: 28/30 Date: 8/24/23  TX#: 29/30   THERAPEUTIC EX  UBE 5/5  PROM L shoulder all planes 12'  SB flexion rolling on wall AAROM flexion 2x10  Wall push ups on ball 3x10  Prone row 5# 3x10  Prone extension 10x3 3#  Prone hor abd 2# 3x10  SL ER 3# 3x10  SL abd 3# 3x10  SL flex 3# 3x10  Tband row 3x10 grn  Tband ext 3x10 grn  Standing flex 2# 3x10  Standing OH press 2#  3x10 THERAPEUTIC EX  UBE 5/5  PROM L shoulder all planes 12'  SB flexion rolling on wall AAROM flexion 2x10  Wall push ups on ball 3x10  Prone row 5# 3x10  Prone extension 10x3 3#  Prone hor abd 2# 3x10  SL ER 3# 3x10  Tband row 3x10 grn  Tband ext 3x10 grn  Standing flex 2# 3x10  Standing OH press 2#  3x10  3 pos body blade (neutral,flex,abd) 2x30\" ea THERAPEUTIC EX  UBE 5/5  PROM L shoulder all planes 12'  SB flexion rolling on wall AAROM flexion 2x10  Wall push ups on ball 3x10  Prone row 5# 3x10  Prone extension 10x3 4#  Prone hor abd 3# 3x10  SL ER 3# 3x10  Tband row 3x10 grn  Tband ext 3x10 grn  Standing flex 2# 3x10  Standing OH press 2#  3x10  3 pos body blade (neutral,flex,abd) 2x30\" ea THERAPEUTIC EX  UBE 5/5  PROM L shoulder all planes 12'  SL ER 3# 3x10  SL abd 3# 3x10  SL flex 3# 3x10  Prone hor abd 3# 3x10  FM pull down 13# 3x10  FM row 17# 3x10  3 pos body blade (90 flex, 90 abd, OH) 2x30\" ea  Wall walks with band 8'x6 yellow THERAPEUTIC EX  UBE 5/5  PROM L shoulder all planes 12'  SL ER 4# 3x10  SL abd 4# 3x10  SL flex 4# 3x10  Prone hor abd 4# 3x10  FM pull down 13# 3x10  FM row 17# 3x10  3 pos body blade (90 flex, 90 abd, OH) 2x30\" ea  Wall walks with band 8'x6 yellow  Standing flex 3# 3x10  Standing OH press 3#  3x10 THERAPEUTIC EX  UBE 5/5  PROM L shoulder all planes 12'  SL ER 4# 3x10  SL abd 4# 3x10  SL flex 4# 3x10  Prone hor abd 4# 3x10  FM pull down 13# 3x10  FM row 17# 3x10  3 pos body blade (90 flex, 90 abd, OH) 2x30\" ea  Wall walks with band 8'x6 yellow  Standing flex 3# 3x10  Standing OH press 3#  3x10                           HEP: Codman's, table slides, submax isometrics    Charges: therapeutic ex 3      Total Timed Treatment: 45 min  Total Treatment Time: 45 min

## 2023-08-28 ENCOUNTER — OFFICE VISIT (OUTPATIENT)
Dept: PHYSICAL THERAPY | Age: 63
End: 2023-08-28
Attending: ORTHOPAEDIC SURGERY
Payer: COMMERCIAL

## 2023-08-28 PROCEDURE — 97110 THERAPEUTIC EXERCISES: CPT

## 2023-08-28 NOTE — PROGRESS NOTES
Diagnosis:     L RTC repair (supra/infra/subscap), SLAP repair, biceps tenodesis     Referring Provider: Shiv  Date of Evaluation:    4/27/2023    Precautions:   See protocol Next MD visit:   none scheduled  Date of Surgery: 4/25/2023   [facility]     # Auth Visits: 36         Subjective: Swelling seems to be much less, mobility better. Pain: 1/10 \"just an ache\"      Objective:     AROM:   Shoulder    Flexion: R 170; L 170  Abduction: R 180; L 150  ER: R 90; L 80  IR: R 60; L 50      PROM:   Shoulder    Flexion: R 170; L 170  Abduction: R 180; L 160  ER: R 90; L 90  IR: R 60; L 50        Assessment: Improved endurance with all PREs and body blade activity.      Goals:   (To be met in 30 visits)   Pt will report improved ability to sleep without waking due to shoulder pain  Pt will improve shoulder flexion AROM to >170 degrees to be able to reach into overhead cabinets without pain or restriction  Pt will improve shoulder abduction AROM to >170 degrees to improve ability to don deodorant, don/doff shirts, and wash hair  Pt will increase shoulder AROM ER to 80 to reach and fasten seatbelt   Pt will increase shoulder AROM IR to 60 to be able to reach in back pocket, tuck in shirt, and turn steering wheel without pain  Pt will improve shoulder strength throughout to 5/5 to improve function with ADLs including lifting and carrying objects  Pt will demonstrate increased mid/low trap strength to 5/5 to promote improved shoulder mechanics and stabilization with ADL such as lifting and reaching   Pt will be independent and compliant with comprehensive HEP to maintain progress achieved in PT  Post QuickDASH Outcome Score  Post Score: 4.55 % (8/8/2023  9:01 AM)    27.27 % improvement    Plan: Progress PREs    Date: 7/27/23  TX#: 25/30 Date: 8/2/23  TX#: 26/30 Date: 8/8/23  TX#: 27/30 Date: 8/10/23  TX#: 28/30 Date: 8/24/23  TX#: 29/30 Date: 8/28/23  TX#: 30/36   THERAPEUTIC EX  UBE 5/5  PROM L shoulder all planes 12'  SB flexion rolling on wall AAROM flexion 2x10  Wall push ups on ball 3x10  Prone row 5# 3x10  Prone extension 10x3 3#  Prone hor abd 2# 3x10  SL ER 3# 3x10  Tband row 3x10 grn  Tband ext 3x10 grn  Standing flex 2# 3x10  Standing OH press 2#  3x10  3 pos body blade (neutral,flex,abd) 2x30\" ea THERAPEUTIC EX  UBE 5/5  PROM L shoulder all planes 12'  SB flexion rolling on wall AAROM flexion 2x10  Wall push ups on ball 3x10  Prone row 5# 3x10  Prone extension 10x3 4#  Prone hor abd 3# 3x10  SL ER 3# 3x10  Tband row 3x10 grn  Tband ext 3x10 grn  Standing flex 2# 3x10  Standing OH press 2#  3x10  3 pos body blade (neutral,flex,abd) 2x30\" ea THERAPEUTIC EX  UBE 5/5  PROM L shoulder all planes 12'  SL ER 3# 3x10  SL abd 3# 3x10  SL flex 3# 3x10  Prone hor abd 3# 3x10  FM pull down 13# 3x10  FM row 17# 3x10  3 pos body blade (90 flex, 90 abd, OH) 2x30\" ea  Wall walks with band 8'x6 yellow THERAPEUTIC EX  UBE 5/5  PROM L shoulder all planes 12'  SL ER 4# 3x10  SL abd 4# 3x10  SL flex 4# 3x10  Prone hor abd 4# 3x10  FM pull down 13# 3x10  FM row 17# 3x10  3 pos body blade (90 flex, 90 abd, OH) 2x30\" ea  Wall walks with band 8'x6 yellow  Standing flex 3# 3x10  Standing OH press 3#  3x10 THERAPEUTIC EX  UBE 5/5  PROM L shoulder all planes 12'  SL ER 4# 3x10  SL abd 4# 3x10  SL flex 4# 3x10  Prone hor abd 4# 3x10  FM pull down 13# 3x10  FM row 17# 3x10  3 pos body blade (90 flex, 90 abd, OH) 2x30\" ea  Wall walks with band 8'x6 yellow  Standing flex 3# 3x10  Standing OH press 3#  3x10 THERAPEUTIC EX  UBE 5/5  PROM L shoulder all planes 12'  SL ER 4# 3x10  SL abd 4# 3x10  SL flex 4# 3x10  Prone hor abd 4# 3x10  FM pull down 13# 3x10  FM row 17# 3x10  3 pos body blade (90 flex, 90 abd, OH) 2x30\" ea  Wall walks with band 8'x6 yellow  Standing flex 3# 3x10  Standing OH press 3#  3x10  Wall push-ups on SB 3x10                           HEP: Codman's, table slides, submax isometrics    Charges: therapeutic ex 3      Total Timed Treatment: 45 min  Total Treatment Time: 45 min

## 2023-08-30 ENCOUNTER — OFFICE VISIT (OUTPATIENT)
Dept: PHYSICAL THERAPY | Age: 63
End: 2023-08-30
Attending: ORTHOPAEDIC SURGERY
Payer: COMMERCIAL

## 2023-08-30 PROCEDURE — 97110 THERAPEUTIC EXERCISES: CPT

## 2023-09-06 ENCOUNTER — OFFICE VISIT (OUTPATIENT)
Dept: PHYSICAL THERAPY | Age: 63
End: 2023-09-06
Attending: ORTHOPAEDIC SURGERY
Payer: COMMERCIAL

## 2023-09-06 PROCEDURE — 97110 THERAPEUTIC EXERCISES: CPT

## 2023-09-06 NOTE — PROGRESS NOTES
Diagnosis:     L RTC repair (supra/infra/subscap), SLAP repair, biceps tenodesis     Referring Provider: Shiv  Date of Evaluation:    4/27/2023    Precautions:   See protocol Next MD visit:   none scheduled  Date of Surgery: 4/25/2023   Insurance Primary/Secondary: [employer] / N/A     # Auth Visits: 36         Subjective: Shoulder feeling pretty good, feel like I might be ready to toss a ball. Pain: 1/10 \"just an ache\"      Objective:     AROM:   Shoulder    Flexion: R 170; L 170  Abduction: R 180; L 150  ER: R 90; L 80  IR: R 60; L 50      MMT:   Shoulder    Flexion: R 5/5; L 4+/5  Abduction: R 5/5; L 4+/5  ER: R 5/5; L 4+/5  IR: R 5/5; L 5/5        Assessment: Added plyotoss to work on throwing. Able to do 20 throws without pain. Continued strength gains throughout the shoulder.      Goals:   (To be met in 30 visits)   Pt will report improved ability to sleep without waking due to shoulder pain  Pt will improve shoulder flexion AROM to >170 degrees to be able to reach into overhead cabinets without pain or restriction  Pt will improve shoulder abduction AROM to >170 degrees to improve ability to don deodorant, don/doff shirts, and wash hair  Pt will increase shoulder AROM ER to 80 to reach and fasten seatbelt   Pt will increase shoulder AROM IR to 60 to be able to reach in back pocket, tuck in shirt, and turn steering wheel without pain  Pt will improve shoulder strength throughout to 5/5 to improve function with ADLs including lifting and carrying objects  Pt will demonstrate increased mid/low trap strength to 5/5 to promote improved shoulder mechanics and stabilization with ADL such as lifting and reaching   Pt will be independent and compliant with comprehensive HEP to maintain progress achieved in PT  Post QuickDASH Outcome Score  Post Score: 4.55 % (8/8/2023  9:01 AM)    27.27 % improvement    Plan: Progress PREs    Date: 8/8/23  TX#: 27/30 Date: 8/10/23  TX#: 28/30 Date: 8/24/23  TX#: 29/30 Date: 8/28/23  TX#: 30/36 ate: 8/30/23  TX#: 31/36 Date: 9/6/23  TX#: 32/36   THERAPEUTIC EX  UBE 5/5  PROM L shoulder all planes 12'  SL ER 3# 3x10  SL abd 3# 3x10  SL flex 3# 3x10  Prone hor abd 3# 3x10  FM pull down 13# 3x10  FM row 17# 3x10  3 pos body blade (90 flex, 90 abd, OH) 2x30\" ea  Wall walks with band 8'x6 yellow THERAPEUTIC EX  UBE 5/5  PROM L shoulder all planes 12'  SL ER 4# 3x10  SL abd 4# 3x10  SL flex 4# 3x10  Prone hor abd 4# 3x10  FM pull down 13# 3x10  FM row 17# 3x10  3 pos body blade (90 flex, 90 abd, OH) 2x30\" ea  Wall walks with band 8'x6 yellow  Standing flex 3# 3x10  Standing OH press 3#  3x10 THERAPEUTIC EX  UBE 5/5  PROM L shoulder all planes 12'  SL ER 4# 3x10  SL abd 4# 3x10  SL flex 4# 3x10  Prone hor abd 4# 3x10  FM pull down 13# 3x10  FM row 17# 3x10  3 pos body blade (90 flex, 90 abd, OH) 2x30\" ea  Wall walks with band 8'x6 yellow  Standing flex 3# 3x10  Standing OH press 3#  3x10 THERAPEUTIC EX  UBE 5/5  PROM L shoulder all planes 12'  SL ER 4# 3x10  SL abd 4# 3x10  SL flex 4# 3x10  Prone hor abd 4# 3x10  FM pull down 13# 3x10  FM row 17# 3x10  3 pos body blade (90 flex, 90 abd, OH) 2x30\" ea  Wall walks with band 8'x6 yellow  Standing flex 3# 3x10  Standing OH press 3#  3x10  Wall push-ups on SB 3x10 THERAPEUTIC EX  UBE 5/5  PROM L shoulder all planes 12'  SL ER 4# 3x10  SL abd 4# 3x10  SL flex 4# 3x10  Prone hor abd 4# 3x10  FM pull down 13# 3x10  FM row 17# 3x10  3 pos body blade (90 flex, 90 abd, OH) 2x30\" ea  Wall walks with band 8'x6 red  Standing flex 4# 3x10  Standing OH press 4#  3x10  Wall push-ups on SB 3x10 THERAPEUTIC EX  UBE 5/5  PROM L shoulder all planes 12'  SL ER 4# 3x10  SL abd 4# 3x10  SL flex 4# 3x10  Prone hor abd 4# 3x10  FM pull down 13# 3x10  FM row 17# 3x10  3 pos body blade (90 flex, 90 abd, OH) 2x30\" ea  Wall walks with band 8'x6 red  Standing flex 4# 3x10  Standing OH press 4#  3x10  Wall push-ups on SB 3x10  Plyotoss grn 2x10                           HEP: Ramila's, table slides, submax isometrics, SL and standing PREs    Charges: therapeutic ex 3      Total Timed Treatment: 45 min  Total Treatment Time: 45 min

## 2023-09-12 ENCOUNTER — OFFICE VISIT (OUTPATIENT)
Dept: PHYSICAL THERAPY | Age: 63
End: 2023-09-12
Attending: ORTHOPAEDIC SURGERY
Payer: COMMERCIAL

## 2023-09-12 PROCEDURE — 97110 THERAPEUTIC EXERCISES: CPT

## 2023-09-14 ENCOUNTER — OFFICE VISIT (OUTPATIENT)
Dept: PHYSICAL THERAPY | Age: 63
End: 2023-09-14
Attending: ORTHOPAEDIC SURGERY
Payer: COMMERCIAL

## 2023-09-14 PROCEDURE — 97110 THERAPEUTIC EXERCISES: CPT

## 2023-09-19 ENCOUNTER — OFFICE VISIT (OUTPATIENT)
Dept: PHYSICAL THERAPY | Age: 63
End: 2023-09-19
Attending: ORTHOPAEDIC SURGERY
Payer: COMMERCIAL

## 2023-09-19 PROCEDURE — 97110 THERAPEUTIC EXERCISES: CPT

## 2023-09-19 NOTE — PROGRESS NOTES
Diagnosis:     L RTC repair (supra/infra/subscap), SLAP repair, biceps tenodesis     Referring Provider: Shiv  Date of Evaluation:    4/27/2023    Precautions:   See protocol Next MD visit:   none scheduled  Date of Surgery: 4/25/2023   [facility] [employer] [facility]     # Auth Visits: 36         Subjective: No pain, just still lacking a little strength. Pain: 0/10      Objective:     AROM:   Shoulder    Flexion: R 170; L 170  Abduction: R 180; L 150  ER: R 90; L 80  IR: R 60; L 50      MMT:   Shoulder    Flexion: R 5/5; L 4+/5  Abduction: R 5/5; L 4+/5  ER: R 5/5; L 4+/5  IR: R 5/5; L 5/5        Assessment: Progressed to scapular wall clocks to increase scapular strength and stability to aid lifting tasks.      Goals:   (To be met in 30 visits)   Pt will report improved ability to sleep without waking due to shoulder pain  Pt will improve shoulder flexion AROM to >170 degrees to be able to reach into overhead cabinets without pain or restriction  Pt will improve shoulder abduction AROM to >170 degrees to improve ability to don deodorant, don/doff shirts, and wash hair  Pt will increase shoulder AROM ER to 80 to reach and fasten seatbelt   Pt will increase shoulder AROM IR to 60 to be able to reach in back pocket, tuck in shirt, and turn steering wheel without pain  Pt will improve shoulder strength throughout to 5/5 to improve function with ADLs including lifting and carrying objects  Pt will demonstrate increased mid/low trap strength to 5/5 to promote improved shoulder mechanics and stabilization with ADL such as lifting and reaching   Pt will be independent and compliant with comprehensive HEP to maintain progress achieved in PT  Post QuickDASH Outcome Score  Post Score: 0 % (9/19/2023  8:58 AM)    31.82 % improvement    Plan: Continue 1x, then DC to HEP    Date: 8/28/23  TX#: 30/36 ate: 8/30/23  TX#: 31/36 Date: 9/6/23  TX#: 32/36 Date: 9/12/23  TX#: 33/36 Date: 9/14/23  TX#: 34/36 Date: 9/19/23  TX#: 35/36   THERAPEUTIC EX  UBE 5/5  PROM L shoulder all planes 12'  SL ER 4# 3x10  SL abd 4# 3x10  SL flex 4# 3x10  Prone hor abd 4# 3x10  FM pull down 13# 3x10  FM row 17# 3x10  3 pos body blade (90 flex, 90 abd, OH) 2x30\" ea  Wall walks with band 8'x6 yellow  Standing flex 3# 3x10  Standing OH press 3#  3x10  Wall push-ups on SB 3x10 THERAPEUTIC EX  UBE 5/5  PROM L shoulder all planes 12'  SL ER 4# 3x10  SL abd 4# 3x10  SL flex 4# 3x10  Prone hor abd 4# 3x10  FM pull down 13# 3x10  FM row 17# 3x10  3 pos body blade (90 flex, 90 abd, OH) 2x30\" ea  Wall walks with band 8'x6 red  Standing flex 4# 3x10  Standing OH press 4#  3x10  Wall push-ups on SB 3x10 THERAPEUTIC EX  UBE 5/5  PROM L shoulder all planes 12'  SL ER 4# 3x10  SL abd 4# 3x10  SL flex 4# 3x10  Prone hor abd 4# 3x10  FM pull down 13# 3x10  FM row 17# 3x10  3 pos body blade (90 flex, 90 abd, OH) 2x30\" ea  Wall walks with band 8'x6 red  Standing flex 4# 3x10  Standing OH press 4#  3x10  Wall push-ups on SB 3x10  Plyotoss grn 2x10 THERAPEUTIC EX  UBE 5/5  PROM L shoulder all planes 12'  SL ER 4# 3x10  SL abd 4# 3x10  SL flex 4# 3x10  Prone hor abd 4# 3x10  FM pull down 17# 3x10  FM row 20# 3x10  3 pos body blade (90 flex, 90 abd, OH) 2x30\" ea  Wall walks with band 8'x6 red  Standing flex 4# 3x10  Standing OH press 4#  3x10  Wall push-ups on SB 3x10  Plyotoss grn 2x10 THERAPEUTIC EX  UBE 5/5  PROM L shoulder all planes 12'  SL ER 4# 3x10  SL abd 4# 3x10  SL flex 4# 3x10  Prone hor abd 4# 3x10  FM pull down 17# 3x10  FM row 20# 3x10  3 pos body blade (90 flex, 90 abd, OH) 2x30\" ea  Wall walks with band 8'x6 red  Standing flex 4# 3x10  Standing OH press 4#  3x10  Wall push-ups on SB 3x10  Plyotoss red 2x10 THERAPEUTIC EX  UBE 5/5  PROM L shoulder all planes 12'  SL ER 4# 3x10  SL abd 4# 3x10  SL flex 4# 3x10  Prone hor abd 4# 3x10  FM pull down 17# 3x10  FM row 20# 3x10  3 pos body blade (90 flex, 90 abd, OH) 2x30\" ea  Wall walks with band 8'x6 red  Standing flex 4# 3x10  Standing OH press 4#  3x10  Tband wall clocks 2x10 yellow  Plyotoss red 2x10                           HEP: Codman's, table slides, submax isometrics, SL and standing PREs    Charges: therapeutic ex 3      Total Timed Treatment: 45 min  Total Treatment Time: 45 min

## 2023-09-21 ENCOUNTER — OFFICE VISIT (OUTPATIENT)
Dept: PHYSICAL THERAPY | Age: 63
End: 2023-09-21
Attending: ORTHOPAEDIC SURGERY
Payer: COMMERCIAL

## 2023-09-21 PROCEDURE — 97110 THERAPEUTIC EXERCISES: CPT
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
## 2023-10-16 ENCOUNTER — TELEPHONE (OUTPATIENT)
Dept: FAMILY MEDICINE CLINIC | Facility: CLINIC | Age: 63
End: 2023-10-16

## 2023-10-16 DIAGNOSIS — R73.03 PREDIABETES: ICD-10-CM

## 2023-10-16 DIAGNOSIS — E78.5 HYPERLIPIDEMIA, UNSPECIFIED HYPERLIPIDEMIA TYPE: Primary | ICD-10-CM

## 2023-10-16 NOTE — TELEPHONE ENCOUNTER
Letter mailed to patient reminding him he is due for labs per pt reminder.     MATT Estrada Mydhili Nurse  Repeat A1c and lipids in 6 months    Lab Frequency Next Occurrence   Hemoglobin A1C [E] Once 10/16/2023   Lipid Panel [E] Once 10/16/2023

## 2024-01-08 ENCOUNTER — TELEPHONE (OUTPATIENT)
Dept: FAMILY MEDICINE CLINIC | Facility: CLINIC | Age: 64
End: 2024-01-08

## 2024-01-08 NOTE — TELEPHONE ENCOUNTER
Letter mailed to patient reminding him he has outstanding orders.    Lab Frequency Next Occurrence   Hemoglobin A1C [E] Once 10/16/2023   Lipid Panel [E] Once 10/16/2023

## 2024-01-18 ENCOUNTER — HOSPITAL ENCOUNTER (OUTPATIENT)
Dept: ULTRASOUND IMAGING | Age: 64
Discharge: HOME | End: 2024-01-18
Attending: SURGERY
Payer: COMMERCIAL

## 2024-01-18 DIAGNOSIS — N50.89 ENLARGED TESTICLE: ICD-10-CM

## 2024-01-18 PROCEDURE — 76870 US EXAM SCROTUM: CPT | Performed by: SURGERY

## 2024-01-18 PROCEDURE — 93975 VASCULAR STUDY: CPT | Performed by: SURGERY

## 2024-05-31 ENCOUNTER — PATIENT MESSAGE (OUTPATIENT)
Dept: ORTHOPEDICS CLINIC | Facility: CLINIC | Age: 64
End: 2024-05-31

## 2024-06-03 NOTE — TELEPHONE ENCOUNTER
From: Bobby Burger  To: Gisel Horton  Sent: 5/31/2024 11:01 PM CDT  Subject: In-grown Toenail corrective proceedure    Hi Dr. Horton, Would I be a candidate?? Could I meet with you about the in-grown toenail issue?? Also, my Rosalina bunion seems to be getting worse. I am 64, and I expect to have declining health from this point on. Right now my biggest issue is foot pain. Do you know if my insurance would contribute to your bill??  Thanks!!  Aleksandr Burger

## 2024-06-03 NOTE — TELEPHONE ENCOUNTER
It looks like pt would like to discuss procedure. Would you like him to follow up with you, or follow up with Dr. Sun?  Please advise, Thank you.

## 2024-10-02 ENCOUNTER — TELEPHONE (OUTPATIENT)
Dept: FAMILY MEDICINE CLINIC | Facility: CLINIC | Age: 64
End: 2024-10-02

## 2024-10-02 ENCOUNTER — OFFICE VISIT (OUTPATIENT)
Dept: FAMILY MEDICINE CLINIC | Facility: CLINIC | Age: 64
End: 2024-10-02
Payer: COMMERCIAL

## 2024-10-02 ENCOUNTER — LAB ENCOUNTER (OUTPATIENT)
Dept: LAB | Age: 64
End: 2024-10-02
Attending: NURSE PRACTITIONER
Payer: COMMERCIAL

## 2024-10-02 VITALS
DIASTOLIC BLOOD PRESSURE: 84 MMHG | SYSTOLIC BLOOD PRESSURE: 124 MMHG | OXYGEN SATURATION: 96 % | HEIGHT: 68 IN | HEART RATE: 79 BPM | BODY MASS INDEX: 27.89 KG/M2 | TEMPERATURE: 98 F | WEIGHT: 184 LBS

## 2024-10-02 DIAGNOSIS — Z00.00 GENERAL MEDICAL EXAM: Primary | ICD-10-CM

## 2024-10-02 DIAGNOSIS — Z12.83 SCREENING EXAM FOR SKIN CANCER: ICD-10-CM

## 2024-10-02 DIAGNOSIS — E66.3 OVERWEIGHT (BMI 25.0-29.9): ICD-10-CM

## 2024-10-02 DIAGNOSIS — E78.5 ELEVATED LIPIDS: ICD-10-CM

## 2024-10-02 DIAGNOSIS — N28.1 RENAL CYST: ICD-10-CM

## 2024-10-02 DIAGNOSIS — Z12.5 SCREENING FOR PROSTATE CANCER: ICD-10-CM

## 2024-10-02 DIAGNOSIS — R73.03 PREDIABETES: ICD-10-CM

## 2024-10-02 DIAGNOSIS — E78.2 MIXED HYPERLIPIDEMIA: ICD-10-CM

## 2024-10-02 DIAGNOSIS — L98.9 SKIN LESIONS: ICD-10-CM

## 2024-10-02 DIAGNOSIS — K76.89 LIVER CYST: ICD-10-CM

## 2024-10-02 DIAGNOSIS — Z00.00 GENERAL MEDICAL EXAM: ICD-10-CM

## 2024-10-02 DIAGNOSIS — E83.52 SERUM CALCIUM ELEVATED: Primary | ICD-10-CM

## 2024-10-02 LAB
ALBUMIN SERPL-MCNC: 5.1 G/DL (ref 3.2–4.8)
ALBUMIN/GLOB SERPL: 1.8 {RATIO} (ref 1–2)
ALP LIVER SERPL-CCNC: 89 U/L
ALT SERPL-CCNC: 68 U/L
ANION GAP SERPL CALC-SCNC: 9 MMOL/L (ref 0–18)
AST SERPL-CCNC: 51 U/L (ref ?–34)
BASOPHILS # BLD AUTO: 0.05 X10(3) UL (ref 0–0.2)
BASOPHILS NFR BLD AUTO: 0.8 %
BILIRUB SERPL-MCNC: 0.6 MG/DL (ref 0.2–1.1)
BUN BLD-MCNC: 14 MG/DL (ref 9–23)
CALCIUM BLD-MCNC: 10.5 MG/DL (ref 8.7–10.4)
CHLORIDE SERPL-SCNC: 106 MMOL/L (ref 98–112)
CHOLEST SERPL-MCNC: 274 MG/DL (ref ?–200)
CO2 SERPL-SCNC: 26 MMOL/L (ref 21–32)
COMPLEXED PSA SERPL-MCNC: 1.04 NG/ML (ref ?–4)
CREAT BLD-MCNC: 0.88 MG/DL
EGFRCR SERPLBLD CKD-EPI 2021: 97 ML/MIN/1.73M2 (ref 60–?)
EOSINOPHIL # BLD AUTO: 0.22 X10(3) UL (ref 0–0.7)
EOSINOPHIL NFR BLD AUTO: 3.7 %
ERYTHROCYTE [DISTWIDTH] IN BLOOD BY AUTOMATED COUNT: 13.2 %
EST. AVERAGE GLUCOSE BLD GHB EST-MCNC: 126 MG/DL (ref 68–126)
FASTING PATIENT LIPID ANSWER: YES
FASTING STATUS PATIENT QL REPORTED: YES
GLOBULIN PLAS-MCNC: 2.8 G/DL (ref 2–3.5)
GLUCOSE BLD-MCNC: 112 MG/DL (ref 70–99)
HBA1C MFR BLD: 6 % (ref ?–5.7)
HCT VFR BLD AUTO: 48.3 %
HDLC SERPL-MCNC: 81 MG/DL (ref 40–59)
HGB BLD-MCNC: 16.2 G/DL
IMM GRANULOCYTES # BLD AUTO: 0.03 X10(3) UL (ref 0–1)
IMM GRANULOCYTES NFR BLD: 0.5 %
LDLC SERPL CALC-MCNC: 171 MG/DL (ref ?–100)
LYMPHOCYTES # BLD AUTO: 1.51 X10(3) UL (ref 1–4)
LYMPHOCYTES NFR BLD AUTO: 25.1 %
MCH RBC QN AUTO: 31.8 PG (ref 26–34)
MCHC RBC AUTO-ENTMCNC: 33.5 G/DL (ref 31–37)
MCV RBC AUTO: 94.7 FL
MONOCYTES # BLD AUTO: 0.59 X10(3) UL (ref 0.1–1)
MONOCYTES NFR BLD AUTO: 9.8 %
NEUTROPHILS # BLD AUTO: 3.62 X10 (3) UL (ref 1.5–7.7)
NEUTROPHILS # BLD AUTO: 3.62 X10(3) UL (ref 1.5–7.7)
NEUTROPHILS NFR BLD AUTO: 60.1 %
NONHDLC SERPL-MCNC: 193 MG/DL (ref ?–130)
OSMOLALITY SERPL CALC.SUM OF ELEC: 293 MOSM/KG (ref 275–295)
PLATELET # BLD AUTO: 239 10(3)UL (ref 150–450)
POTASSIUM SERPL-SCNC: 4.1 MMOL/L (ref 3.5–5.1)
PROT SERPL-MCNC: 7.9 G/DL (ref 5.7–8.2)
RBC # BLD AUTO: 5.1 X10(6)UL
SODIUM SERPL-SCNC: 141 MMOL/L (ref 136–145)
TRIGL SERPL-MCNC: 124 MG/DL (ref 30–149)
TSI SER-ACNC: 3.04 MIU/ML (ref 0.55–4.78)
VLDLC SERPL CALC-MCNC: 25 MG/DL (ref 0–30)
WBC # BLD AUTO: 6 X10(3) UL (ref 4–11)

## 2024-10-02 PROCEDURE — 3074F SYST BP LT 130 MM HG: CPT | Performed by: NURSE PRACTITIONER

## 2024-10-02 PROCEDURE — 3008F BODY MASS INDEX DOCD: CPT | Performed by: NURSE PRACTITIONER

## 2024-10-02 PROCEDURE — 85025 COMPLETE CBC W/AUTO DIFF WBC: CPT

## 2024-10-02 PROCEDURE — 99396 PREV VISIT EST AGE 40-64: CPT | Performed by: NURSE PRACTITIONER

## 2024-10-02 PROCEDURE — 84443 ASSAY THYROID STIM HORMONE: CPT

## 2024-10-02 PROCEDURE — 36415 COLL VENOUS BLD VENIPUNCTURE: CPT

## 2024-10-02 PROCEDURE — 83036 HEMOGLOBIN GLYCOSYLATED A1C: CPT

## 2024-10-02 PROCEDURE — 3079F DIAST BP 80-89 MM HG: CPT | Performed by: NURSE PRACTITIONER

## 2024-10-02 PROCEDURE — 80061 LIPID PANEL: CPT

## 2024-10-02 PROCEDURE — 80053 COMPREHEN METABOLIC PANEL: CPT

## 2024-10-02 NOTE — TELEPHONE ENCOUNTER
----- Message from Celena Doshi sent at 10/2/2024 12:02 PM CDT -----  Results reviewed.   A1C stable in prediabetic range  Cholesterol is elevated. Recommend starting cholesterol lowering medication  Calcium mildly elevated. Recheck in 6 weeks for comparison  Thyroid function normal  PSA normal  No anemia
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
## 2024-11-01 ENCOUNTER — TELEPHONE (OUTPATIENT)
Dept: FAMILY MEDICINE CLINIC | Facility: CLINIC | Age: 64
End: 2024-11-01

## 2024-11-13 ENCOUNTER — TELEPHONE (OUTPATIENT)
Dept: FAMILY MEDICINE CLINIC | Facility: CLINIC | Age: 64
End: 2024-11-13

## 2024-12-01 ENCOUNTER — HOSPITAL ENCOUNTER (OUTPATIENT)
Age: 64
Discharge: HOME | End: 2024-12-01
Payer: COMMERCIAL

## 2024-12-01 ENCOUNTER — APPOINTMENT (OUTPATIENT)
Dept: GENERAL RADIOLOGY | Age: 64
End: 2024-12-01
Attending: NURSE PRACTITIONER
Payer: COMMERCIAL

## 2024-12-01 VITALS
HEIGHT: 68 IN | WEIGHT: 185 LBS | BODY MASS INDEX: 28.04 KG/M2 | RESPIRATION RATE: 20 BRPM | SYSTOLIC BLOOD PRESSURE: 132 MMHG | HEART RATE: 68 BPM | TEMPERATURE: 99 F | DIASTOLIC BLOOD PRESSURE: 78 MMHG | OXYGEN SATURATION: 97 %

## 2024-12-01 DIAGNOSIS — J10.1 INFLUENZA A: Primary | ICD-10-CM

## 2024-12-01 LAB
POCT INFLUENZA A: POSITIVE
POCT INFLUENZA B: NEGATIVE
SARS-COV-2 RNA RESP QL NAA+PROBE: NOT DETECTED

## 2024-12-01 PROCEDURE — 87502 INFLUENZA DNA AMP PROBE: CPT | Performed by: NURSE PRACTITIONER

## 2024-12-01 PROCEDURE — U0002 COVID-19 LAB TEST NON-CDC: HCPCS | Performed by: NURSE PRACTITIONER

## 2024-12-01 PROCEDURE — 71046 X-RAY EXAM CHEST 2 VIEWS: CPT | Performed by: NURSE PRACTITIONER

## 2024-12-01 PROCEDURE — 99214 OFFICE O/P EST MOD 30 MIN: CPT | Performed by: NURSE PRACTITIONER

## 2024-12-01 RX ORDER — PREDNISONE 20 MG/1
40 TABLET ORAL DAILY
Qty: 10 TABLET | Refills: 0 | Status: SHIPPED | OUTPATIENT
Start: 2024-12-01 | End: 2024-12-06

## 2024-12-01 RX ORDER — ALBUTEROL SULFATE 90 UG/1
2 INHALANT RESPIRATORY (INHALATION) EVERY 4 HOURS PRN
Qty: 1 EACH | Refills: 0 | Status: SHIPPED | OUTPATIENT
Start: 2024-12-01 | End: 2024-12-31
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
## 2025-01-09 ENCOUNTER — TELEPHONE (OUTPATIENT)
Dept: FAMILY MEDICINE CLINIC | Facility: CLINIC | Age: 65
End: 2025-01-09

## 2025-02-19 NOTE — TELEPHONE ENCOUNTER
[facility] Hematology and Oncology Progress Note    Patient: Kerry Mills  MRN: [id number]  Date of Service: Feb 19, 2025             ECOG Performance    0 - Independent          ______________________________________________________________________________  Oncologic history  1) invasive ductal carcinoma of the breast ER negative MO negative HER2/kobe 2+, negative by FISH.  3.9 cm in maximum dimension on MRI.  Diagnosed July 2024    Treatment  1) neoadjuvant AC started on 7/22/2024.  CarboTaxol and Pembro denied by insurance   2) neoadjuvant weekly paclitaxel started on 9/17/2024  3) last 2 weekly dose of Taxol held due to neuropathy  4) patient underwent lumpectomy with sentinel lymph node sampling on 6 February 2025.  Found to have a complete pathologic response with no residual cancer      History of Present Illness  Patient is here in follow-up.  She has completed her surgical management.  She is here to discuss the results.  She otherwise feels fine.  She has no other concerns.  She does complain of pain in her axilla since the surgery and had questions about that.    Review of systems  12 point review of system was negative    Past History    Past Medical History:   Diagnosis Date    Asthma     Chronic constipation     Difficulty walking     Walking troubles        Past Surgical History:   Procedure Laterality Date    COLONOSCOPY N/A 12/31/2024    Procedure: COLONOSCOPY;  Surgeon: Ayla Byrd MD;  Location: [facility] OR    INCISION AND DRAINAGE OF WOUND N/A 11/27/2020    Procedure: INCISION AND DRAINAGE, NECK;  Surgeon: Arnel James MD;  Location: [facility] OR;  Service: ENT    INSERT PORT VASCULAR ACCESS Right 07/09/2024    Procedure: INSERTION,  RIGHT VASCULAR ACCESS PORT;  Surgeon: Iwona Choe DO;  Location: [facility] OR    LUMPECTOMY BREAST Left 2/6/2025    Procedure: BREAST WIRE LOCALIZED LUMPECTOMY WITH SENTINEL LYMPH NODE BIOPSY AND PORT-A-CATH REMOVAL;  Surgeon:  Wife states pt has had long standing cough- and it is getting worse and hanging on. Wife also states UTI thing going on?   Has to go to the bathroom but then when he gets to the bathroom he doesn't have to go    Wife had Chandni in December- pt had loss of "Iwona Choe, DO;  Location: [facility] OR    PICC INSERTION - TRIPLE LUMEN  11/26/2020         TRACHEOSTOMY N/A 11/26/2020    Procedure: EMERGENT INTUBATION IN OR WITH CREATION, TRACHEOSTOMY;  Surgeon: Dennise Justice MD;  [place] [facility];  Service: General       Physical Exam    /81 (BP Location: Right arm, Patient Position: Sitting, Cuff Size: Adult Regular)   Pulse 82   Resp 18   Ht 1.499 m (4' 11\")   Wt 60.2 kg (132 lb 12.8 oz)   SpO2 97%   BMI 26.82 kg/m      General: alert, awake, not in acute distress  HEENT: Head: Normal, normocephalic, atraumatic.  Eye: Normal external eye, conjunctiva, lids cornea, MIKAYLA.  Nose: Normal external nose, mucus membranes and septum.  Pharynx: Normal buccal mucosa. Normal pharynx.  Neck / Thyroid: Supple, no masses, nodes, nodules or enlargement.  Lymphatics: No abnormally enlarged lymph nodes.  Chest: Normal chest wall and respirations. Clear to auscultation.  No crackles or rhonchi's  Heart: S1 S2 RRR, no murmur.   Abdomen: abdomen is soft without significant tenderness, masses, organomegaly or guarding  Extremities: normal strength, tone, and muscle mass  Skin: normal. no rash or abnormalities  CNS: non focal.    Lab Results    No results found for this or any previous visit (from the past 240 hours).        Imaging    US Breast Loc w [place] Node Injection Left    Result Date: 2/6/2025  INDICATION: Pre-operative localization of invasive ductal carcinoma at 10 o'clock, 1 cm from the nipple. PROCEDURE: Informed consent was obtained from the patient. The breast was cleansed with ChloraPrep. Lidocaine was used for local anesthesia. A -gauge needle was then advanced to the area of abnormality. A localization wire was then deployed. 490uCi 99mTc Lymphoseek was injected subdermally along the upper outer aspect of the areolar margin. Post-procedure mammograms demonstrate the localization wire in appropriate position. The previously placed marker " was visualized. The patient tolerated this well.    IMPRESSION: Sonographically guided  wire localization. A specimen was sent for radiography. Specimen radiograph demonstrates the area of abnormality and the localization wire to be included in the specimen.     MA Post Procedure Left    Result Date: 2/6/2025  INDICATION: Pre-operative localization of invasive ductal carcinoma at 10 o'clock, 1 cm from the nipple. PROCEDURE: Informed consent was obtained from the patient. The breast was cleansed with ChloraPrep. Lidocaine was used for local anesthesia. A -gauge needle was then advanced to the area of abnormality. A localization wire was then deployed. 490uCi 99mTc Lymphoseek was injected subdermally along the upper outer aspect of the areolar margin. Post-procedure mammograms demonstrate the localization wire in appropriate position. The previously placed marker was visualized. The patient tolerated this well.    IMPRESSION: Sonographically guided  wire localization. A specimen was sent for radiography. Specimen radiograph demonstrates the area of abnormality and the localization wire to be included in the specimen.     MA Breast Specimen Left    Result Date: 2/6/2025  INDICATION: Pre-operative localization of invasive ductal carcinoma at 10 o'clock, 1 cm from the nipple. PROCEDURE: Informed consent was obtained from the patient. The breast was cleansed with ChloraPrep. Lidocaine was used for local anesthesia. A -gauge needle was then advanced to the area of abnormality. A localization wire was then deployed. 490uCi 99mTc Lymphoseek was injected subdermally along the upper outer aspect of the areolar margin. Post-procedure mammograms demonstrate the localization wire in appropriate position. The previously placed marker was visualized. The patient tolerated this well.    IMPRESSION: Sonographically guided  wire localization. A specimen was sent for radiography. Specimen radiograph demonstrates the area of abnormality  and the localization wire to be included in the specimen.          Assessment and Plan      Breast cancer, triple negative patient receiving neoadjuvant chemotherapy   Patient is here in follow-up.  Patient has completed her surgical management.  She had a complete pathologic response with no residual cancer.  This was discussed with the patient and her daughter.  I explained to them that patients have a complete pathologic response have an excellent prognosis with risk of relapse being exceedingly low.  Patient was happy to hear that.  I will refer her for adjuvant radiation at this point I will see her in 3 months.  Her next mammogram is most likely going to be due in June and will be set up upon her next visit      Neuropathy   Patient did develop neuropathy from the Taxol.  She did not complain too much about that today and it seems like it is already getting better.      Varsha Adamson MD            CC: HIRAL CHAVEZ MD

## 2025-04-10 ENCOUNTER — LAB REQUISITION (OUTPATIENT)
Dept: LAB | Facility: HOSPITAL | Age: 65
End: 2025-04-10
Payer: COMMERCIAL

## 2025-04-10 DIAGNOSIS — D48.5 NEOPLASM OF UNCERTAIN BEHAVIOR OF SKIN: ICD-10-CM

## 2025-04-10 PROCEDURE — 88305 TISSUE EXAM BY PATHOLOGIST: CPT

## 2025-05-10 ENCOUNTER — TELEPHONE (OUTPATIENT)
Dept: FAMILY MEDICINE CLINIC | Facility: CLINIC | Age: 65
End: 2025-05-10

## 2025-05-10 NOTE — TELEPHONE ENCOUNTER
Received fax from Ethan that may need carotid US due to ocular migraines. Per Milli Doshi, our nurse practitioner schedule office visit to discuss  Patient advised. Verbalized understanding.   Future Appointments   Date Time Provider Department Center   5/12/2025  9:00 AM Celena Doshi APRN [facility]

## 2025-05-12 ENCOUNTER — TELEPHONE (OUTPATIENT)
Dept: FAMILY MEDICINE CLINIC | Facility: CLINIC | Age: 65
End: 2025-05-12

## 2025-05-12 ENCOUNTER — PATIENT MESSAGE (OUTPATIENT)
Dept: FAMILY MEDICINE CLINIC | Facility: CLINIC | Age: 65
End: 2025-05-12

## 2025-05-12 ENCOUNTER — HOSPITAL ENCOUNTER (OUTPATIENT)
Dept: GENERAL RADIOLOGY | Age: 65
Discharge: HOME | End: 2025-05-12
Attending: NURSE PRACTITIONER
Payer: COMMERCIAL

## 2025-05-12 ENCOUNTER — OFFICE VISIT (OUTPATIENT)
Dept: FAMILY MEDICINE CLINIC | Facility: CLINIC | Age: 65
End: 2025-05-12
Payer: COMMERCIAL

## 2025-05-12 VITALS
SYSTOLIC BLOOD PRESSURE: 124 MMHG | WEIGHT: 192 LBS | OXYGEN SATURATION: 98 % | HEIGHT: 68 IN | DIASTOLIC BLOOD PRESSURE: 82 MMHG | HEART RATE: 60 BPM | BODY MASS INDEX: 29.1 KG/M2 | TEMPERATURE: 97 F

## 2025-05-12 DIAGNOSIS — E66.3 OVERWEIGHT (BMI 25.0-29.9): ICD-10-CM

## 2025-05-12 DIAGNOSIS — G89.29 CHRONIC PAIN OF LEFT THUMB: ICD-10-CM

## 2025-05-12 DIAGNOSIS — G43.109 OCULAR MIGRAINE: Primary | ICD-10-CM

## 2025-05-12 DIAGNOSIS — R73.03 PREDIABETES: ICD-10-CM

## 2025-05-12 DIAGNOSIS — R74.8 ELEVATED LIVER ENZYMES: ICD-10-CM

## 2025-05-12 DIAGNOSIS — M79.645 CHRONIC PAIN OF LEFT THUMB: ICD-10-CM

## 2025-05-12 DIAGNOSIS — F17.290 TOBACCO PIPE SMOKER: ICD-10-CM

## 2025-05-12 DIAGNOSIS — E83.52 HYPERCALCEMIA: ICD-10-CM

## 2025-05-12 DIAGNOSIS — E78.2 MIXED HYPERLIPIDEMIA: ICD-10-CM

## 2025-05-12 PROBLEM — M79.671 PAIN IN BOTH FEET: Status: RESOLVED | Noted: 2023-06-20 | Resolved: 2025-05-12

## 2025-05-12 PROBLEM — M79.672 PAIN IN BOTH FEET: Status: RESOLVED | Noted: 2023-06-20 | Resolved: 2025-05-12

## 2025-05-12 PROBLEM — M62.08 DIASTASIS RECTI: Status: RESOLVED | Noted: 2022-04-01 | Resolved: 2025-05-12

## 2025-05-12 PROBLEM — F17.220 CHEWING TOBACCO DEPENDENCE: Status: ACTIVE | Noted: 2025-05-12

## 2025-05-12 PROCEDURE — 3074F SYST BP LT 130 MM HG: CPT | Performed by: NURSE PRACTITIONER

## 2025-05-12 PROCEDURE — G2211 COMPLEX E/M VISIT ADD ON: HCPCS | Performed by: NURSE PRACTITIONER

## 2025-05-12 PROCEDURE — 73140 X-RAY EXAM OF FINGER(S): CPT | Performed by: NURSE PRACTITIONER

## 2025-05-12 PROCEDURE — 3008F BODY MASS INDEX DOCD: CPT | Performed by: NURSE PRACTITIONER

## 2025-05-12 PROCEDURE — 3079F DIAST BP 80-89 MM HG: CPT | Performed by: NURSE PRACTITIONER

## 2025-05-12 PROCEDURE — 99214 OFFICE O/P EST MOD 30 MIN: CPT | Performed by: NURSE PRACTITIONER

## 2025-05-12 RX ORDER — MULTIVIT-MIN/IRON/FOLIC ACID/K 18-600-40
CAPSULE ORAL
COMMUNITY
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
## 2025-05-14 ENCOUNTER — HOSPITAL ENCOUNTER (OUTPATIENT)
Dept: ULTRASOUND IMAGING | Facility: HOSPITAL | Age: 65
Discharge: HOME | End: 2025-05-14
Attending: NURSE PRACTITIONER
Payer: COMMERCIAL

## 2025-05-14 ENCOUNTER — PATIENT MESSAGE (OUTPATIENT)
Dept: FAMILY MEDICINE CLINIC | Facility: CLINIC | Age: 65
End: 2025-05-14

## 2025-05-14 DIAGNOSIS — G43.109 OCULAR MIGRAINE: ICD-10-CM

## 2025-05-14 DIAGNOSIS — E78.2 MIXED HYPERLIPIDEMIA: ICD-10-CM

## 2025-05-14 DIAGNOSIS — F17.290 TOBACCO PIPE SMOKER: ICD-10-CM

## 2025-05-14 PROCEDURE — 93880 EXTRACRANIAL BILAT STUDY: CPT | Performed by: NURSE PRACTITIONER

## 2025-05-14 NOTE — TELEPHONE ENCOUNTER
----- Message from Celena Doshi sent at 5/14/2025 11:47 AM CDT -----  Results reviewed.   No significant carotid stenosis noted  ----- Message -----  From: Lori Gonzalez In  Sent: 5/14/2025  11:45 AM CDT  To: YOANA Ayala

## 2025-06-11 ENCOUNTER — TELEPHONE (OUTPATIENT)
Dept: FAMILY MEDICINE CLINIC | Facility: CLINIC | Age: 65
End: 2025-06-11

## 2025-08-29 ENCOUNTER — TELEPHONE (OUTPATIENT)
Dept: FAMILY MEDICINE CLINIC | Facility: CLINIC | Age: 65
End: 2025-08-29

## 2025-08-29 DIAGNOSIS — H53.9 VISION CHANGES: Primary | ICD-10-CM

## (undated) DEVICE — STERILE POLYISOPRENE POWDER-FREE SURGICAL GLOVES: Brand: PROTEXIS

## (undated) DEVICE — SOLUTION  .9 3000ML

## (undated) DEVICE — TRAP 4 CPTR CHMBR N EZ INLN

## (undated) DEVICE — SUPER TURBOVAC 90 IFS: Brand: COBLATION

## (undated) DEVICE — #15 STERILE STAINLESS BLADE: Brand: STERILE STAINLESS BLADES

## (undated) DEVICE — GOWN,SIRUS,FABRIC-REINFORCED,X-LARGE: Brand: MEDLINE

## (undated) DEVICE — LIGHT HANDLE

## (undated) DEVICE — KIT TRC TRIMANO BEACH CHR ARM

## (undated) DEVICE — FILTERLINE NASAL ADULT O2/CO2

## (undated) DEVICE — 3M™ IOBAN™ 2 ANTIMICROBIAL INCISE DRAPE 6648EZ: Brand: IOBAN™ 2

## (undated) DEVICE — PLUMEPORT ACTIV LAPAROSCOPIC SMOKE FILTRATION DEVICE: Brand: PLUMEPORT ACTIVE

## (undated) DEVICE — NEEDLE CONTRAST INTERJECT 25G

## (undated) DEVICE — DERMABOND CLOSURE 0.7ML TOPICL

## (undated) DEVICE — SCD SLEEVE KNEE HI BLEND

## (undated) DEVICE — PAD SACRAL PREMIUM 12X12X1

## (undated) DEVICE — COVER LIGHT HANDLE RIGID GREEN

## (undated) DEVICE — REM POLYHESIVE ADULT PATIENT RETURN ELECTRODE: Brand: VALLEYLAB

## (undated) DEVICE — STOCK SURG LG 48X9IN

## (undated) DEVICE — TUBING DW OUTFLOW

## (undated) DEVICE — SHOULDER ARTHROSCOPY CDS-LF: Brand: MEDLINE INDUSTRIES, INC.

## (undated) DEVICE — SUTURE MONOCRYL 4-0 PS-2

## (undated) DEVICE — GENERAL LAPAROS CDS-LF: Brand: MEDLINE INDUSTRIES, INC.

## (undated) DEVICE — SLEEVE KENDALL SCD EXPRESS MED

## (undated) DEVICE — Device: Brand: DEFENDO AIR/WATER/SUCTION AND BIOPSY VALVE

## (undated) DEVICE — CAUTERY PENCIL

## (undated) DEVICE — [TOMCAT CUTTER, ARTHROSCOPIC SHAVER BLADE,  DO NOT RESTERILIZE,  DO NOT USE IF PACKAGE IS DAMAGED,  KEEP DRY,  KEEP AWAY FROM SUNLIGHT]: Brand: FORMULA

## (undated) DEVICE — SNARE CAPTIFLEX MICRO-OVL OLY

## (undated) DEVICE — DRAPE,U/SHT,SPLIT,FILM,60X84,STERILE: Brand: MEDLINE

## (undated) DEVICE — Device

## (undated) DEVICE — 3M™ STERI-STRIP™ REINFORCED ADHESIVE SKIN CLOSURES, R1547, 1/2 IN X 4 IN (12 MM X 100 MM), 6 STRIPS/ENVELOPE: Brand: 3M™ STERI-STRIP™

## (undated) DEVICE — VIOLET BRAIDED (POLYGLACTIN 910), SYNTHETIC ABSORBABLE SUTURE: Brand: COATED VICRYL

## (undated) DEVICE — SUTURE ETHIBOND EXCEL 2-0 SH

## (undated) DEVICE — TUBING IRR 16FT CNT WV 3 ASCP

## (undated) DEVICE — SUTURE VICRYL 3-0 SH

## (undated) DEVICE — TROCARS: Brand: KII® BALLOON BLUNT TIP SYSTEM

## (undated) DEVICE — [RESECTOR CUTTER, ARTHROSCOPIC SHAVER BLADE,  DO NOT RESTERILIZE,  DO NOT USE IF PACKAGE IS DAMAGED,  KEEP DRY,  KEEP AWAY FROM SUNLIGHT]: Brand: FORMULA

## (undated) DEVICE — Device: Brand: SPOT EX ENDOSCOPIC TATTOO

## (undated) DEVICE — 1200CC GUARDIAN II: Brand: GUARDIAN

## (undated) DEVICE — STERILE SYNTHETIC POLYISOPRENE POWDER-FREE SURGICAL GLOVES WITH HYDROGEL COATING: Brand: PROTEXIS

## (undated) DEVICE — HARMONIC 1100 SHEARS, 36CM SHAFT LENGTH: Brand: HARMONIC

## (undated) DEVICE — ENDOSCOPY PACK - LOWER: Brand: MEDLINE INDUSTRIES, INC.

## (undated) DEVICE — CHLORAPREP 26ML APPLICATOR

## (undated) DEVICE — ALCOHOL 70% 4 OZ

## (undated) DEVICE — SUT FIBERTAPE 2 AR-7237

## (undated) DEVICE — SUTURE SILK 2-0 SH

## (undated) DEVICE — GAUZE TRAY STERILE 4X4 12PLY

## (undated) DEVICE — SUT MONOCRYL 3-0 PS-2 Y427H

## (undated) DEVICE — 1000 S-DRAPE TOWEL DRAPE 10/BX: Brand: STERI-DRAPE™

## (undated) DEVICE — TROCAR: Brand: KII SHIELDED BLADED ACCESS SYSTEM

## (undated) DEVICE — STRYKER HARMONIC 36CM REPRCSED

## (undated) DEVICE — SOL  .9 1000ML BTL

## (undated) DEVICE — SUT TIGERLOOP 2 AR-7234T

## (undated) DEVICE — STERILE POLYISOPRENE POWDER-FREE SURGICAL GLOVES WITH EMOLLIENT COATING: Brand: PROTEXIS

## (undated) DEVICE — 3M™ RED DOT™ MONITORING ELECTRODE WITH FOAM TAPE AND STICKY GEL, 50/BAG, 20/CASE, 72/PLT 2570: Brand: RED DOT™

## (undated) DEVICE — TROCAR: Brand: KII® SLEEVE

## (undated) DEVICE — CAPSURE PERMANENT FIXATION SYSTEM 30 PERMANENT FASTENERS: Brand: CAPSURE PERMANENT FIXATION SYSTEM

## (undated) DEVICE — SHEET,DRAPE,70X100,STERILE: Brand: MEDLINE

## (undated) DEVICE — 3M™ TEGADERM™ +PAD FILM DRESSING WITH NON-ADHERENT PAD, 3584, 2-3/8 IN X 4 IN (6 CM X 10 CM), 50/CAR, 4 CAR/CS: Brand: 3M™ TEGADERM™

## (undated) DEVICE — SUTURE PASSER AR-4068-90

## (undated) DEVICE — COVER,MAYO STAND,STERILE: Brand: MEDLINE

## (undated) DEVICE — MEDI-VAC NON-CONDUCTIVE SUCTION TUBING: Brand: CARDINAL HEALTH

## (undated) DEVICE — 40765 BEACH CHAIR HEAD RESTRAINT: Brand: 40765 BEACH CHAIR HEAD RESTRAINT

## (undated) DEVICE — GLOVE SURG TRIUMPH SZ 71/2

## (undated) DEVICE — SUT MONOCRYL 2-0 SH Y417H

## (undated) NOTE — LETTER
Bobby Burger   [address] [place] [id number]           Dear Bobby Burger     Our records indicate that you have outstanding lab work and or testing that was ordered for you and has not yet been completed:  Lab Frequency Next Occurrence   Hemoglobin A1C [E] Once 10/16/2023   Lipid Panel [E] Once 10/16/2023      To provide you with the best possible care, please complete these orders at your earliest convenience. If you have recently completed these orders please disregard this letter.     If you have any questions please call the office at [phone].     Thank you,     [facility]

## (undated) NOTE — Clinical Note
I saw Gokul Moya in the office. He presents with a left inguinal and umbilical hernia. I am planning laparoscopic repair of the left inguinal hernia and repair of the umbilical hernia at the same time. Thank you. Gaston

## (undated) NOTE — LETTER
06/14/21          Rx: Custom orthotics  TIMES 2        DX: Gait abnormality, midfoot arthritis                                                                                   Arya Mccabe DPM

## (undated) NOTE — LETTER
Lucy Abreu   [address] [place] [address]           Dear Lucy Abreu     Our records indicate that you have outstanding lab work and or testing that was ordered for you and has not yet been completed:  Lab Frequency Next Occurrence   Hemoglobin A1C [E] Once 10/16/2023   Lipid Panel [E] Once 10/16/2023      To provide you with the best possible care, please complete these orders at your earliest convenience. If you have recently completed these orders please disregard this letter. If you have any questions please call the office at [phone].      Thank you,     [facility]

## (undated) NOTE — LETTER
Aren Cedeño  [address] [address]    6/2/2022      Dear  Aren Cedeño    In order to provide the highest quality care, [facility] uses a sophisticated computer system to track our patient's records. You are due for your 2nd Shingrix vaccine (due 2-6 months after 1st vaccine).     Please call the office at your earliest convenience to schedule your appointment or test.          Sincerely,        The office of [facility]  [phone]

## (undated) NOTE — Clinical Note
Myron Dugan saw Josesito Garcia in the office. He has multiple complaints, he has a large left inguinal hernia as well as an umbilical hernia. He also has a history of colon polyps and is due for repeat colonoscopy.   I am scheduling him for colonoscopy as well as

## (undated) NOTE — LETTER
23  [facility] Orthopedic Surgery   Pre-Operative Clearance Request    Patient Name:   Myrtle Gilbert             :   1960    Surgeon: Dr. Ag العراقي             Date of Surgery: 23     Surgical Procedure: LEFT SHOULDER ARTHROSCOPY RTCR, BICEPS TENODESIS, SAD, DCE. Please complete all of the following 2-3 weeks PRIOR TO your scheduled surgery to avoid potential cancellation. [x]  History and Physical       [x]  Medical  Clearance                          **Please fax test results, H&P, and clearance to [phone] and to                                      P. A.T at [phone]**

## (undated) NOTE — Clinical Note
I saw Zoila Patton in the office, he presents with a recurrent left inguinal hernia. Patient underwent recent shoulder surgery and would like to get back to work and return in a few months to have the hernia repaired, this is reasonable.   Thank you Leo Richardson

## (undated) NOTE — LETTER
Merritt Lopez  [address] [address]    10/8/2021      Dear  Merritt Lopez    In order to provide the highest quality care, [facility] uses a sophisticated computer system to track our patient's r

## (undated) NOTE — Clinical Note
Lizandro Foster saw Myriam Vasquez in the office. He presents for his first screening colonoscopy. Will schedule at the next available date.   Thank you MARY PADILLA